# Patient Record
Sex: MALE | Race: WHITE | NOT HISPANIC OR LATINO | Employment: OTHER | URBAN - METROPOLITAN AREA
[De-identification: names, ages, dates, MRNs, and addresses within clinical notes are randomized per-mention and may not be internally consistent; named-entity substitution may affect disease eponyms.]

---

## 2017-11-02 ENCOUNTER — OFFICE VISIT (OUTPATIENT)
Dept: SURGERY | Facility: CLINIC | Age: 72
End: 2017-11-02
Payer: COMMERCIAL

## 2017-11-02 VITALS
HEART RATE: 71 BPM | SYSTOLIC BLOOD PRESSURE: 120 MMHG | WEIGHT: 159 LBS | HEIGHT: 68 IN | DIASTOLIC BLOOD PRESSURE: 72 MMHG | RESPIRATION RATE: 16 BRPM | BODY MASS INDEX: 24.1 KG/M2

## 2017-11-02 DIAGNOSIS — K40.90 LEFT INGUINAL HERNIA: Primary | ICD-10-CM

## 2017-11-02 PROCEDURE — 99204 OFFICE O/P NEW MOD 45 MIN: CPT | Performed by: SURGERY

## 2017-11-02 RX ORDER — IBUPROFEN 200 MG
400 TABLET ORAL PRN
Status: ON HOLD | COMMUNITY
End: 2019-08-28

## 2017-11-02 NOTE — MR AVS SNAPSHOT
"              After Visit Summary   2017    Jose Alfredo Caceres    MRN: [id number]           Patient Information     Date Of Birth          1945        Visit Information        Provider Department      2017 11:00 AM Brennan Boyd MD [facility]       Follow-ups after your visit        Who to contact     If you have questions or need follow up information about today's clinic visit or your schedule please contact [facility] directly at [phone].  Normal or non-critical lab and imaging results will be communicated to you by [employer], letter or phone within 4 business days after the clinic has received the results. If you do not hear from us within 7 days, please contact the clinic through [employer] or phone. If you have a critical or abnormal lab result, we will notify you by phone as soon as possible.  Submit refill requests through [employer] or call your pharmacy and they will forward the refill request to us. Please allow 3 business days for your refill to be completed.          Additional Information About Your Visit        MyChart Information     [employer] lets you send messages to your doctor, view your test results, renew your prescriptions, schedule appointments and more. To sign up, go to [email or web address] . Click on \"Log in\" on the left side of the screen, which will take you to the Welcome page. Then click on \"Sign up Now\" on the right side of the page.     You will be asked to enter the access code listed below, as well as some personal information. Please follow the directions to create your username and password.     Your access code is: [email or web address]  Expires: 2018 11:00 AM     Your access code will  in 90 days. If you need help or a new code, please call your [facility]3] or [phone].        Care EveryWhere ID     This is your Care EveryWhere ID. This could be used by other organizations " "to access your [place] medical records  [id number]        Your Vitals Were     Pulse Respirations Height BMI (Body Mass Index)          71 16 5' 8\" (1.727 m) 24.18 kg/m2         Blood Pressure from Last 3 Encounters:   11/02/17 120/72    Weight from Last 3 Encounters:   11/02/17 159 lb (72.1 kg)              Today, you had the following     No orders found for display       Primary Care Provider Office Phone # Fax #    Kendell Navarrete [phone]       [address] [address]        Equal Access to Services     [facility]: Hadii aad ku hadasho Soomaali, waaxda luqadaha, qaybta kaalmada adeegyada, river holt adesarath pisano . So [facility] [phone].    ATENCIÓN: Si habla español, tiene a britt disposición servicios gratuitos de asistencia lingüística. [facility] [phone].    We comply with applicable federal civil rights laws and [place] laws. We do not discriminate on the basis of race, color, national origin, age, disability, sex, sexual orientation, or gender identity.            Thank you!     Thank you for choosing [facility]  for your care. Our goal is always to provide you with excellent care. Hearing back from our patients is one way we can continue to improve our services. Please take a few minutes to complete the written survey that you may receive in the mail after your visit with us. Thank you!             Your Updated Medication List - Protect others around you: Learn how to safely use, store and throw away your medicines at www.disposemymeds.org.          This list is accurate as of: 11/2/17 11:00 AM.  Always use your most recent med list.                   Brand Name Dispense Instructions for use Diagnosis    ASPIRIN PO      Take by mouth as needed for moderate pain        IBUPROFEN PO      Take 400 mg by mouth as needed for moderate pain          "

## 2017-11-02 NOTE — LETTER
"2017    Re: Jose Alfredo Caceres : 1945    Patient is a 72-year-old gentleman referred by the above-mentioned primary care provider for consultation regarding left inguinal hernia.  Reports he noticed this approximately three months ago. He been lifting some heavy logs.  He noted a bulge proximally one week later.  This causes occasional mild discomfort.  No signs or symptoms to suggest incarceration or strangulation.  No bowel obstruction symptoms.     PMH:   has no past medical history on file.  PSH:    has a past surgical history that includes orthopedic surgery (Right, 2017).  Social History:   reports that he quit smoking about 49 years ago. He does not have any smokeless tobacco history on file. He reports that he drinks alcohol. He reports that he does not use illicit drugs.  Family History:  family history includes Other Cancer in his mother.  Medications/Allergies: Home medications and allergies reviewed.     ROS:  The 10 point Review of Systems is negative other than noted in the HPI.     Physical Exam:  /72  Pulse 71  Resp 16  Ht 5' 8\" (1.727 m)  Wt 159 lb (72.1 kg)  BMI 24.18 kg/m2  GENERAL: Generally appears well.  Psych: Alert and Oriented.  Normal affect  Eyes: Sclera clear  Respiratory:  Lungs clear to ausculation bilaterally with good air excursion  Cardiovascular:  Regular Rate and Rhythm with no murmurs gallops or rubs, normal peripheral pulses  GI: Abdomen Non Distended Non-Tender  No hernias palpated..  Groin- I examined the patient in both the standing and supine positions. Right Groin- No hernia Palpated. Left Groin- Moderate sized inguinal hernia.  Hernia was easily reduciable. No scrotal or testicle abnormalities.  Lymphatic/Hematologic/Immune:  No femoral or cervical lymphadenopathy.  Integumentary:  No rashes  Neurological: grossly intact      All new lab and imaging data was reviewed.      Impression and Plan:  Patient is a 72 year old male with left inguinal " hernia     PLAN: Plan outpatient laparoscopic repair possible bilateral and his convenience  I discussed the pathophysiology of hernias and options for repair including laparoscopic VS open.  The risks associated with the procedure including, but not limited to, recurrence, nerve entrapment or injury, persistence of pain, injury to the bowel/bladder, infertility, hematoma, mesh migration, mesh infection, MI, and PE were discussed with the patient. He indicated understanding of the discussion, asked appropriate questions, and provided consent. Signs and symptoms of incarceration were discussed. If these develop in the interim, he promises to call or go straight to the ER. I have provided the patient with an information pamphlet.  Thank you very much for this consult.     Brennan Boyd M.D.

## 2017-11-06 NOTE — PROGRESS NOTES
"[facility]  Surgery Consultation    CONSULTATION REQUESTED BY:  Kendell Navarrete [phone]    HPI: Patient is a 72-year-old gentleman referred by the above-mentioned primary care provider for consultation regarding left inguinal hernia.  Reports he noticed this approximately three months ago.  He been lifting some heavy logs.  He noted a bulge proximally one week later.  This causes occasional mild discomfort.  No signs or symptoms to suggest incarceration or strangulation.  No bowel obstruction symptoms.    PMH:   has no past medical history on file.  PSH:    has a past surgical history that includes orthopedic surgery (Right, 02/2017).  Social History:   reports that he quit smoking about 49 years ago. He does not have any smokeless tobacco history on file. He reports that he drinks alcohol. He reports that he does not use illicit drugs.  Family History:  family history includes Other Cancer in his mother.  Medications/Allergies: Home medications and allergies reviewed.    ROS:  The 10 point Review of Systems is negative other than noted in the HPI.    Physical Exam:  /72  Pulse 71  Resp 16  Ht 5' 8\" (1.727 m)  Wt 159 lb (72.1 kg)  BMI 24.18 kg/m2  GENERAL: Generally appears well.  Psych: Alert and Oriented.  Normal affect  Eyes: Sclera clear  Respiratory:  Lungs clear to ausculation bilaterally with good air excursion  Cardiovascular:  Regular Rate and Rhythm with no murmurs gallops or rubs, normal peripheral pulses  GI: Abdomen Non Distended Non-Tender  No hernias palpated..  Groin- I examined the patient in both the standing and supine positions. Right Groin- No hernia Palpated. Left Groin- Moderate sized inguinal hernia.  Hernia was easily reduciable. No scrotal or testicle abnormalities.  Lymphatic/Hematologic/Immune:  No femoral or cervical lymphadenopathy.  Integumentary:  No rashes  Neurological: grossly intact     All new lab and imaging data was reviewed.     Impression " and Plan:  Patient is a 72 year old male with left inguinal hernia    PLAN: Plan outpatient laparoscopic repair possible bilateral and his convenience  I discussed the pathophysiology of hernias and options for repair including laparoscopic VS open.  The risks associated with the procedure including, but not limited to, recurrence, nerve entrapment or injury, persistence of pain, injury to the bowel/bladder, infertility, hematoma, mesh migration, mesh infection, MI, and PE were discussed with the patient. He indicated understanding of the discussion, asked appropriate questions, and provided consent. Signs and symptoms of incarceration were discussed. If these develop in the interim, he promises to call or go straight to the ER. I have provided the patient with an information pamphlet.    Thank you very much for this consult.    Brennan Boyd M.D.  [facility]  [phone]    Please route or send letter to:  Primary Care Provider (PCP) and Referring Provider

## 2017-11-10 ENCOUNTER — APPOINTMENT (OUTPATIENT)
Dept: SURGERY | Facility: PHYSICIAN GROUP | Age: 72
End: 2017-11-10
Payer: COMMERCIAL

## 2017-11-10 ENCOUNTER — ANESTHESIA (OUTPATIENT)
Dept: SURGERY | Facility: CLINIC | Age: 72
End: 2017-11-10
Payer: MEDICARE

## 2017-11-10 ENCOUNTER — HOSPITAL ENCOUNTER (OUTPATIENT)
Facility: CLINIC | Age: 72
Discharge: HOME | End: 2017-11-10
Attending: SURGERY | Admitting: SURGERY
Payer: MEDICARE

## 2017-11-10 ENCOUNTER — ANESTHESIA EVENT (OUTPATIENT)
Dept: SURGERY | Facility: CLINIC | Age: 72
End: 2017-11-10
Payer: MEDICARE

## 2017-11-10 ENCOUNTER — SURGERY (OUTPATIENT)
Age: 72
End: 2017-11-10

## 2017-11-10 VITALS
RESPIRATION RATE: 18 BRPM | BODY MASS INDEX: 24.52 KG/M2 | OXYGEN SATURATION: 100 % | WEIGHT: 161.8 LBS | HEIGHT: 68 IN | SYSTOLIC BLOOD PRESSURE: 122 MMHG | DIASTOLIC BLOOD PRESSURE: 68 MMHG | TEMPERATURE: 97.4 F

## 2017-11-10 DIAGNOSIS — K40.20 NON-RECURRENT BILATERAL INGUINAL HERNIA WITHOUT OBSTRUCTION OR GANGRENE: Primary | ICD-10-CM

## 2017-11-10 PROCEDURE — 25000566 ZZH SEVOFLURANE, EA 15 MIN: Performed by: SURGERY

## 2017-11-10 PROCEDURE — 25000128 H RX IP 250 OP 636: Performed by: NURSE ANESTHETIST, CERTIFIED REGISTERED

## 2017-11-10 PROCEDURE — 25000125 ZZHC RX 250: Performed by: NURSE ANESTHETIST, CERTIFIED REGISTERED

## 2017-11-10 PROCEDURE — 37000008 ZZH ANESTHESIA TECHNICAL FEE, 1ST 30 MIN: Performed by: SURGERY

## 2017-11-10 PROCEDURE — 49650 LAP ING HERNIA REPAIR INIT: CPT | Mod: AS | Performed by: PHYSICIAN ASSISTANT

## 2017-11-10 PROCEDURE — A9270 NON-COVERED ITEM OR SERVICE: HCPCS | Mod: GY | Performed by: PHYSICIAN ASSISTANT

## 2017-11-10 PROCEDURE — 71000013 ZZH RECOVERY PHASE 1 LEVEL 1 EA ADDTL HR: Performed by: SURGERY

## 2017-11-10 PROCEDURE — 25000132 ZZH RX MED GY IP 250 OP 250 PS 637: Mod: GY | Performed by: PHYSICIAN ASSISTANT

## 2017-11-10 PROCEDURE — 25000125 ZZHC RX 250: Performed by: SURGERY

## 2017-11-10 PROCEDURE — 25000128 H RX IP 250 OP 636: Performed by: SURGERY

## 2017-11-10 PROCEDURE — C1781 MESH (IMPLANTABLE): HCPCS | Performed by: SURGERY

## 2017-11-10 PROCEDURE — 49650 LAP ING HERNIA REPAIR INIT: CPT | Mod: 50 | Performed by: SURGERY

## 2017-11-10 PROCEDURE — 27210794 ZZH OR GENERAL SUPPLY STERILE: Performed by: SURGERY

## 2017-11-10 PROCEDURE — 71000012 ZZH RECOVERY PHASE 1 LEVEL 1 FIRST HR: Performed by: SURGERY

## 2017-11-10 PROCEDURE — 36000056 ZZH SURGERY LEVEL 3 1ST 30 MIN: Performed by: SURGERY

## 2017-11-10 PROCEDURE — 37000009 ZZH ANESTHESIA TECHNICAL FEE, EACH ADDTL 15 MIN: Performed by: SURGERY

## 2017-11-10 PROCEDURE — 71000027 ZZH RECOVERY PHASE 2 EACH 15 MINS: Performed by: SURGERY

## 2017-11-10 PROCEDURE — 40000170 ZZH STATISTIC PRE-PROCEDURE ASSESSMENT II: Performed by: SURGERY

## 2017-11-10 PROCEDURE — 36000058 ZZH SURGERY LEVEL 3 EA 15 ADDTL MIN: Performed by: SURGERY

## 2017-11-10 DEVICE — MESH PROGRIP LAPAROSCOPIC 5.9X3.9" PARIETEX SELF-FIX LPG1510: Type: IMPLANTABLE DEVICE | Site: INGUINAL | Status: FUNCTIONAL

## 2017-11-10 RX ORDER — FENTANYL CITRATE 50 UG/ML
INJECTION, SOLUTION INTRAMUSCULAR; INTRAVENOUS PRN
Status: DISCONTINUED | OUTPATIENT
Start: 2017-11-10 | End: 2017-11-10

## 2017-11-10 RX ORDER — PROPOFOL 10 MG/ML
INJECTION, EMULSION INTRAVENOUS PRN
Status: DISCONTINUED | OUTPATIENT
Start: 2017-11-10 | End: 2017-11-10

## 2017-11-10 RX ORDER — SODIUM CHLORIDE, SODIUM LACTATE, POTASSIUM CHLORIDE, CALCIUM CHLORIDE 600; 310; 30; 20 MG/100ML; MG/100ML; MG/100ML; MG/100ML
INJECTION, SOLUTION INTRAVENOUS CONTINUOUS
Status: DISCONTINUED | OUTPATIENT
Start: 2017-11-10 | End: 2017-11-10 | Stop reason: HOSPADM

## 2017-11-10 RX ORDER — HYDROMORPHONE HYDROCHLORIDE 1 MG/ML
.3-.5 INJECTION, SOLUTION INTRAMUSCULAR; INTRAVENOUS; SUBCUTANEOUS EVERY 10 MIN PRN
Status: DISCONTINUED | OUTPATIENT
Start: 2017-11-10 | End: 2017-11-10 | Stop reason: HOSPADM

## 2017-11-10 RX ORDER — SODIUM CHLORIDE, SODIUM LACTATE, POTASSIUM CHLORIDE, CALCIUM CHLORIDE 600; 310; 30; 20 MG/100ML; MG/100ML; MG/100ML; MG/100ML
INJECTION, SOLUTION INTRAVENOUS CONTINUOUS PRN
Status: DISCONTINUED | OUTPATIENT
Start: 2017-11-10 | End: 2017-11-10

## 2017-11-10 RX ORDER — NALOXONE HYDROCHLORIDE 0.4 MG/ML
.1-.4 INJECTION, SOLUTION INTRAMUSCULAR; INTRAVENOUS; SUBCUTANEOUS
Status: DISCONTINUED | OUTPATIENT
Start: 2017-11-10 | End: 2017-11-10 | Stop reason: HOSPADM

## 2017-11-10 RX ORDER — FENTANYL CITRATE 0.05 MG/ML
25-50 INJECTION, SOLUTION INTRAMUSCULAR; INTRAVENOUS
Status: DISCONTINUED | OUTPATIENT
Start: 2017-11-10 | End: 2017-11-10 | Stop reason: HOSPADM

## 2017-11-10 RX ORDER — PROPOFOL 10 MG/ML
INJECTION, EMULSION INTRAVENOUS CONTINUOUS PRN
Status: DISCONTINUED | OUTPATIENT
Start: 2017-11-10 | End: 2017-11-10

## 2017-11-10 RX ORDER — ONDANSETRON 2 MG/ML
4 INJECTION INTRAMUSCULAR; INTRAVENOUS EVERY 30 MIN PRN
Status: DISCONTINUED | OUTPATIENT
Start: 2017-11-10 | End: 2017-11-10 | Stop reason: HOSPADM

## 2017-11-10 RX ORDER — GLYCOPYRROLATE 0.2 MG/ML
INJECTION, SOLUTION INTRAMUSCULAR; INTRAVENOUS PRN
Status: DISCONTINUED | OUTPATIENT
Start: 2017-11-10 | End: 2017-11-10

## 2017-11-10 RX ORDER — ONDANSETRON 4 MG/1
4 TABLET, ORALLY DISINTEGRATING ORAL EVERY 30 MIN PRN
Status: DISCONTINUED | OUTPATIENT
Start: 2017-11-10 | End: 2017-11-10 | Stop reason: HOSPADM

## 2017-11-10 RX ORDER — OXYCODONE AND ACETAMINOPHEN 5; 325 MG/1; MG/1
1-2 TABLET ORAL
Status: COMPLETED | OUTPATIENT
Start: 2017-11-10 | End: 2017-11-10

## 2017-11-10 RX ORDER — ONDANSETRON 2 MG/ML
INJECTION INTRAMUSCULAR; INTRAVENOUS PRN
Status: DISCONTINUED | OUTPATIENT
Start: 2017-11-10 | End: 2017-11-10

## 2017-11-10 RX ORDER — BUPIVACAINE HYDROCHLORIDE AND EPINEPHRINE 2.5; 5 MG/ML; UG/ML
30 INJECTION, SOLUTION INFILTRATION; PERINEURAL ONCE
Status: DISCONTINUED | OUTPATIENT
Start: 2017-11-10 | End: 2017-11-10 | Stop reason: HOSPADM

## 2017-11-10 RX ORDER — LIDOCAINE HYDROCHLORIDE 20 MG/ML
INJECTION, SOLUTION INFILTRATION; PERINEURAL PRN
Status: DISCONTINUED | OUTPATIENT
Start: 2017-11-10 | End: 2017-11-10

## 2017-11-10 RX ORDER — OXYCODONE AND ACETAMINOPHEN 5; 325 MG/1; MG/1
1-2 TABLET ORAL EVERY 4 HOURS PRN
Qty: 30 TABLET | Refills: 0 | Status: SHIPPED | OUTPATIENT
Start: 2017-11-10 | End: 2017-11-29

## 2017-11-10 RX ORDER — CEFAZOLIN SODIUM 2 G/100ML
2 INJECTION, SOLUTION INTRAVENOUS
Status: COMPLETED | OUTPATIENT
Start: 2017-11-10 | End: 2017-11-10

## 2017-11-10 RX ORDER — MEPERIDINE HYDROCHLORIDE 25 MG/ML
12.5 INJECTION INTRAMUSCULAR; INTRAVENOUS; SUBCUTANEOUS
Status: DISCONTINUED | OUTPATIENT
Start: 2017-11-10 | End: 2017-11-10 | Stop reason: HOSPADM

## 2017-11-10 RX ORDER — DEXAMETHASONE SODIUM PHOSPHATE 4 MG/ML
INJECTION, SOLUTION INTRA-ARTICULAR; INTRALESIONAL; INTRAMUSCULAR; INTRAVENOUS; SOFT TISSUE PRN
Status: DISCONTINUED | OUTPATIENT
Start: 2017-11-10 | End: 2017-11-10

## 2017-11-10 RX ORDER — CEFAZOLIN SODIUM 1 G/3ML
1 INJECTION, POWDER, FOR SOLUTION INTRAMUSCULAR; INTRAVENOUS SEE ADMIN INSTRUCTIONS
Status: DISCONTINUED | OUTPATIENT
Start: 2017-11-10 | End: 2017-11-10 | Stop reason: HOSPADM

## 2017-11-10 RX ORDER — BUPIVACAINE HYDROCHLORIDE AND EPINEPHRINE 5; 5 MG/ML; UG/ML
INJECTION, SOLUTION PERINEURAL PRN
Status: DISCONTINUED | OUTPATIENT
Start: 2017-11-10 | End: 2017-11-10 | Stop reason: HOSPADM

## 2017-11-10 RX ORDER — EPHEDRINE SULFATE 50 MG/ML
INJECTION, SOLUTION INTRAMUSCULAR; INTRAVENOUS; SUBCUTANEOUS PRN
Status: DISCONTINUED | OUTPATIENT
Start: 2017-11-10 | End: 2017-11-10

## 2017-11-10 RX ORDER — NEOSTIGMINE METHYLSULFATE 1 MG/ML
VIAL (ML) INJECTION PRN
Status: DISCONTINUED | OUTPATIENT
Start: 2017-11-10 | End: 2017-11-10

## 2017-11-10 RX ADMIN — NEOSTIGMINE METHYLSULFATE 3.5 MG: 1 INJECTION INTRAMUSCULAR; INTRAVENOUS; SUBCUTANEOUS at 08:10

## 2017-11-10 RX ADMIN — PROPOFOL 30 MCG/KG/MIN: 10 INJECTION, EMULSION INTRAVENOUS at 07:56

## 2017-11-10 RX ADMIN — SODIUM CHLORIDE, POTASSIUM CHLORIDE, SODIUM LACTATE AND CALCIUM CHLORIDE: 600; 310; 30; 20 INJECTION, SOLUTION INTRAVENOUS at 08:55

## 2017-11-10 RX ADMIN — HYDROMORPHONE HYDROCHLORIDE 0.5 MG: 1 INJECTION, SOLUTION INTRAMUSCULAR; INTRAVENOUS; SUBCUTANEOUS at 09:40

## 2017-11-10 RX ADMIN — HYDROMORPHONE HYDROCHLORIDE 0.5 MG: 1 INJECTION, SOLUTION INTRAMUSCULAR; INTRAVENOUS; SUBCUTANEOUS at 10:00

## 2017-11-10 RX ADMIN — Medication 5 MG: at 08:00

## 2017-11-10 RX ADMIN — FENTANYL CITRATE 50 MCG: 50 INJECTION INTRAMUSCULAR; INTRAVENOUS at 08:50

## 2017-11-10 RX ADMIN — CEFAZOLIN SODIUM 2 G: 2 INJECTION, SOLUTION INTRAVENOUS at 07:33

## 2017-11-10 RX ADMIN — LIDOCAINE HYDROCHLORIDE 80 MG: 20 INJECTION, SOLUTION INFILTRATION; PERINEURAL at 07:23

## 2017-11-10 RX ADMIN — FENTANYL CITRATE 100 MCG: 50 INJECTION, SOLUTION INTRAMUSCULAR; INTRAVENOUS at 07:23

## 2017-11-10 RX ADMIN — DEXAMETHASONE SODIUM PHOSPHATE 4 MG: 4 INJECTION, SOLUTION INTRA-ARTICULAR; INTRALESIONAL; INTRAMUSCULAR; INTRAVENOUS; SOFT TISSUE at 07:52

## 2017-11-10 RX ADMIN — ONDANSETRON 4 MG: 2 INJECTION INTRAMUSCULAR; INTRAVENOUS at 08:10

## 2017-11-10 RX ADMIN — FENTANYL CITRATE 50 MCG: 50 INJECTION INTRAMUSCULAR; INTRAVENOUS at 09:00

## 2017-11-10 RX ADMIN — BUPIVACAINE HYDROCHLORIDE AND EPINEPHRINE BITARTRATE 30 ML: 5; .005 INJECTION, SOLUTION PERINEURAL at 08:00

## 2017-11-10 RX ADMIN — SODIUM CHLORIDE, POTASSIUM CHLORIDE, SODIUM LACTATE AND CALCIUM CHLORIDE: 600; 310; 30; 20 INJECTION, SOLUTION INTRAVENOUS at 07:21

## 2017-11-10 RX ADMIN — PROPOFOL 200 MG: 10 INJECTION, EMULSION INTRAVENOUS at 07:23

## 2017-11-10 RX ADMIN — OXYCODONE HYDROCHLORIDE AND ACETAMINOPHEN 1 TABLET: 5; 325 TABLET ORAL at 09:55

## 2017-11-10 RX ADMIN — HYDROMORPHONE HYDROCHLORIDE 0.5 MG: 1 INJECTION, SOLUTION INTRAMUSCULAR; INTRAVENOUS; SUBCUTANEOUS at 09:17

## 2017-11-10 RX ADMIN — GLYCOPYRROLATE 0.5 MG: 0.2 INJECTION, SOLUTION INTRAMUSCULAR; INTRAVENOUS at 08:10

## 2017-11-10 NOTE — DISCHARGE INSTRUCTIONS
Same Day Surgery Discharge Instructions for  Sedation and General Anesthesia       It's not unusual to feel dizzy, light-headed or faint for up to 24 hours after surgery or while taking pain medication.  If you have these symptoms: sit for a few minutes before standing and have someone assist you when you get up to walk or use the bathroom.      You should rest and relax for the next 24 hours. We recommend you make arrangements to have an adult stay with you for at least 24 hours after your discharge.  Avoid hazardous and strenuous activity.      DO NOT DRIVE any vehicle or operate mechanical equipment for 24 hours following the end of your surgery.  Even though you may feel normal, your reactions may be affected by the medication you have received.      Do not drink alcoholic beverages for 24 hours following surgery.       Slowly progress to your regular diet as you feel able. It's not unusual to feel nauseated and/or vomit after receiving anesthesia.  If you develop these symptoms, drink clear liquids (apple juice, ginger ale, broth, 7-up, etc. ) until you feel better.  If your nausea and vomiting persists for 24 hours, please notify your surgeon.        All narcotic pain medications, along with inactivity and anesthesia, can cause constipation. Drinking plenty of liquids and increasing fiber intake will help.      For any questions of a medical nature, call your surgeon.      Do not make important decisions for 24 hours.      If you had general anesthesia, you may have a sore throat for a couple of days related to the breathing tube used during surgery.  You may use Cepacol lozenges to help with this discomfort.  If it worsens or if you develop a fever, contact your surgeon.       If you feel your pain is not well managed with the pain medications prescribed by your surgeon, please contact your surgeon's office to let them know so they can address your concerns.       [facility]  Discharge Instructions: Post-Operative Laparoscopic Inguinal Hernia    ACTIVITY    Increase your activity gradually.  Avoid strenuous physical activity or heavy lifting greater than 15 lbs. for 3-4 weeks.  You may climb stairs.     You may drive without restrictions when you are not using any prescription pain medication and comfortable in a car.    You may return to work/school when you are comfortable without any prescription pain medication.    WOUND CARE    You may remove your bandage and shower 48 hours after the surgery.  Pat your incisions dry and leave open to air.  Re-apply dressing (Band-aid or gauze/tape) as needed for drainage.    You may have steri-strips (looks like tape) or Dermabond (looks like glue) on your incision.  Leave it alone, it will peel up and fall off on its own.     Do not soak your incisions in a tub or pool for 2 weeks.     DIET    Return to the diet you were on before surgery.    Pain medications can cause constipation.  Limit use when possible.  Take over the counter stool softener/stimulant, such as Colace or Senna, with plenty of water.      PAIN    Expect some tenderness and discomfort at the incision site(s).  Use the prescribed pain medication at your discretion.  Expect gradual resolution of your pain over several days.    You may take ibuprofen with food (unless you have been told not to) instead of or in addition to your prescribed pain medication.  If you are taking Norco or Percocet, do not take any additional acetaminophen/APAP/Tylenol.    Do not drink alcohol or drive while you are taking pain medications.    You may apply ice to your incisions in 20 minute intervals as needed for the next 48 hours.  After that time, consider switching to heat if you prefer.    EXPECTATIONS    Male patients can expect some swelling, bruising possibly involving the testicles and penis, and/or some numbness.  These symptoms are expected.  Use ice to help with the swelling.  Try  placing a rolled hand towel below your scrotum to help alleviate your scrotal discomfort.     You may have shoulder or upper back discomfort due to the gas used in surgery.  This is temporary and should resolve in 48-72 hours.  Short, frequent walks may help with this.    RETURN APPOINTMENT    Follow up with your surgeon or a PA in 2 weeks.  Please call the office at [phone] to schedule your appointment.    CALL OUR OFFICE IF YOU HAVE:     Chills or fever above 101.5 F.    Increased redness or drainage at your incisions.    Significant bleeding.    Pain not relieved by your pain medication or rest.    Increasing pain after the first 48 hours.    Any other concerns or questions.    Revised August 2017                            **If you have questions or concerns about your procedure,  call Dr. Boyd at [phone]**

## 2017-11-10 NOTE — ANESTHESIA PREPROCEDURE EVALUATION
Procedure: Procedure(s):  LAPAROSCOPIC HERNIORRHAPHY INGUINAL  LAPAROSCOPIC HERNIORRHAPHY INGUINAL BILATERAL  Preop diagnosis: left inguinal hernia    No Known Allergies  Past Medical History:   Diagnosis Date     Back pain      Elevated C-reactive protein (CRP)      Fracture of lower leg     ski accident     Homocysteinemia (H)      Hyperlipidemia      Inguinal hernia, left      Past Surgical History:   Procedure Laterality Date     GENITOURINARY SURGERY      vasectomy     HEAD & NECK SURGERY      wisdom teeth extraction     ORTHOPEDIC SURGERY Right 02/2017    Foot      ORTHOPEDIC SURGERY      tibia fracture surgery     Social History   Substance Use Topics     Smoking status: Former Smoker     Quit date: 1/1/1968     Smokeless tobacco: Never Used     Alcohol use Yes      Comment: 1 glass wine per day     Prior to Admission medications    Medication Sig Start Date End Date Taking? Authorizing Provider   IBUPROFEN PO Take 400 mg by mouth as needed for moderate pain   Yes Reported, Patient   ASPIRIN PO Take by mouth as needed for moderate pain   Yes Reported, Patient     Current Facility-Administered Medications Ordered in Epic   Medication Dose Route Frequency Last Rate Last Dose     ceFAZolin (ANCEF) intermittent infusion 2 g in 100 mL dextrose PRE-MIX  2 g Intravenous Pre-Op/Pre-procedure x 1 dose         ceFAZolin (ANCEF) 1 g vial to attach to  ml bag for ADULT or 50 ml bag for PEDS  1 g Intravenous See Admin Instructions         No current [facility]-ordered outpatient prescriptions on file.        Wt Readings from Last 1 Encounters:   11/10/17 73.4 kg (161 lb 12.8 oz)     Temp Readings from Last 1 Encounters:   11/10/17 36.1  C (97  F) (Temporal)     BP Readings from Last 6 Encounters:   11/10/17 137/87   11/02/17 120/72     Pulse Readings from Last 4 Encounters:   11/02/17 71     Resp Readings from Last 1 Encounters:   11/10/17 16     SpO2 Readings from Last 1 Encounters:   11/10/17 95%     No results for  input(s): NA, POTASSIUM, CHLORIDE, CO2, ANIONGAP, GLC, BUN, CR, KIANNA in the last 08432 hours.  No results for input(s): AST, ALT, ALKPHOS, BILITOTAL, LIPASE in the last 11641 hours.  No results for input(s): WBC, HGB, PLT in the last 37879 hours.  No results for input(s): ABO, RH in the last 68814 hours.  No results for input(s): INR, PTT in the last 64104 hours.   No results for input(s): TROPI in the last 78265 hours.  No results for input(s): PH, PCO2, PO2, HCO3 in the last 57852 hours.  No results for input(s): HCG in the last 06992 hours.  No results found for this or any previous visit (from the past 744 hour(s)).    RECENT LABS:   ECG:   ECHO:       Anesthesia Evaluation     .             ROS/MED HX    ENT/Pulmonary:  - neg pulmonary ROS     Neurologic:  - neg neurologic ROS     Cardiovascular:     (+) Dyslipidemia, ----. : . . . :. .       METS/Exercise Tolerance:  >4 METS   Hematologic:         Musculoskeletal:         GI/Hepatic:         Renal/Genitourinary:         Endo:         Psychiatric:  - neg psychiatric ROS       Infectious Disease:         Malignancy:         Other:                     Physical Exam  Normal systems: dental    Airway   Mallampati: I  TM distance: >3 FB  Neck ROM: full    Dental     Cardiovascular   Rhythm and rate: regular and normal      Pulmonary    breath sounds clear to auscultation                    Anesthesia Plan      History & Physical Review  History and physical reviewed and following examination; no interval change.    ASA Status:  2 .    NPO Status:  > 8 hours    Plan for General and ETT with Intravenous and Propofol induction. Maintenance will be Balanced.    PONV prophylaxis:  Ondansetron (or other 5HT-3) and Dexamethasone or Solumedrol       Postoperative Care  Postoperative pain management:  IV analgesics.      Consents  Anesthetic plan, risks, benefits and alternatives discussed with:  Patient..                          .

## 2017-11-10 NOTE — BRIEF OP NOTE
[facility] Brief Operative Note    Pre-operative diagnosis: Left inguinal hernia   Post-operative diagnosis Bilateral inguinal hernias     Procedure: Procedure(s):  Laparoscopic bilateral inguinal hernia repair with mesh   Wound Class: I-Clean     Surgeon(s): Surgeon(s) and Role:     * Brennan Boyd MD - Primary     * Zarina Claudio PA-C - Assisting   Estimated blood loss: 2ml    Specimens: * No specimens in log *   Findings: 27l68hc progrip mesh placed x 2. No immediate complications. See operative report for full details.           Zarina Claudio PA-C  [facility]  [phone]

## 2017-11-10 NOTE — IP AVS SNAPSHOT
MRN:[id number]                      After Visit Summary   11/10/2017    Jose Alfredo Caceres    MRN: [id number]           Thank you!     Thank you for choosing [facility] for your care. Our goal is always to provide you with excellent care. Hearing back from our patients is one way we can continue to improve our services. Please take a few minutes to complete the written survey that you may receive in the mail after you visit with us. Thank you!        Patient Information     Date Of Birth          1945        About your hospital stay     You were admitted on:  November 10, 2017 You last received care in the:  [facility] PACU    You were discharged on:  November 10, 2017       Who to Call     For medical emergencies, please call 911.  For non-urgent questions about your medical care, please call your primary care provider or clinic, [phone]  For questions related to your surgery, please call your surgery clinic        Attending Provider     Provider Specialty    Brennan Boyd MD General Surgery       Primary Care Provider Office Phone # Fax #    Kendell Navarrete [phone]      After Care Instructions     Diet Instructions       Resume pre-procedure diet            Discharge Instructions       Please follow-up in 2 weeks in Dr. Boyd's office for your first postoperative appointment. Call [phone] to schedule.  Our [facility] name [facility]. The address is [address] [place] [address] [address]\[facility] [place] [facility]\"", [address], [place], [address]            Discharge Instructions       CONSTIPATION PREVENTION  We recommend that you go to a pharmacy and purchase ONE of the following stool softeners/laxatives and start taking today to prevent constipation. You can stop this bowel regimen once you are no longer taking your narcotic pain medication or are having regular bowel movements:    - Senokot oral once daily  - Milk of magnesium once daily  - Docusate Sodium 1 pill twice daily (stool  softener)  - Miralax 1 scoop/packet 1-2 times daily (laxative)    In addition to the above options, if constipation continues, you can add:   - 4 oz Prune juice or Plum juice daily for constipation t            Discharge Instructions       GROIN SWELLING/DISCOLORATION AFTER HERNIA REPAIR  You may notice air in your scrotum and/or penis initially after the surgery. This is due to the insufflation we use during the laparoscopic operation. This will go away over the next few days. You may also notice bruising or a purple discoloration to your scrotum and/or penis - this will also resolve over the next week or two. You can apply ice as needed to your incisions and groin as needed for pain and swelling. If you develop significant scrotal or penile pain please call our office at [phone].            Dressing       Keep dressings clean and dry. Remove the bandaids on post-op day #2 (Sunday). Do not remove the small white steri strips over your incision. These will typically fall off on their own in 7-10 days. Do not attempt to replace these if they should fall off sooner. On post-op day #2, after you've removed the bandaids, you can shower normally. You can allow warm water and soap to run over the incision. Do not scrub the incision. After showering pat your skin and steri strips dry. Do not submerge or soak your incisions under water for 2 weeks.            No Alcohol       For 24 hours post procedure            No driving or operating machinery        until the day after procedure            No lifting        No lifting over 20 lbs and no strenuous physical activity for 2-3 weeks                  Further instructions from your care team       Same Day Surgery Discharge Instructions for  Sedation and General Anesthesia       It's not unusual to feel dizzy, light-headed or faint for up to 24 hours after surgery or while taking pain medication.  If you have these symptoms: sit for a few minutes before standing and have  someone assist you when you get up to walk or use the bathroom.      You should rest and relax for the next 24 hours. We recommend you make arrangements to have an adult stay with you for at least 24 hours after your discharge.  Avoid hazardous and strenuous activity.      DO NOT DRIVE any vehicle or operate mechanical equipment for 24 hours following the end of your surgery.  Even though you may feel normal, your reactions may be affected by the medication you have received.      Do not drink alcoholic beverages for 24 hours following surgery.       Slowly progress to your regular diet as you feel able. It's not unusual to feel nauseated and/or vomit after receiving anesthesia.  If you develop these symptoms, drink clear liquids (apple juice, ginger ale, broth, 7-up, etc. ) until you feel better.  If your nausea and vomiting persists for 24 hours, please notify your surgeon.        All narcotic pain medications, along with inactivity and anesthesia, can cause constipation. Drinking plenty of liquids and increasing fiber intake will help.      For any questions of a medical nature, call your surgeon.      Do not make important decisions for 24 hours.      If you had general anesthesia, you may have a sore throat for a couple of days related to the breathing tube used during surgery.  You may use Cepacol lozenges to help with this discomfort.  If it worsens or if you develop a fever, contact your surgeon.       If you feel your pain is not well managed with the pain medications prescribed by your surgeon, please contact your surgeon's office to let them know so they can address your concerns.       [facility]  Discharge Instructions: Post-Operative Laparoscopic Inguinal Hernia    ACTIVITY    Increase your activity gradually.  Avoid strenuous physical activity or heavy lifting greater than 15 lbs. for 3-4 weeks.  You may climb stairs.     You may drive without restrictions when  you are not using any prescription pain medication and comfortable in a car.    You may return to work/school when you are comfortable without any prescription pain medication.    WOUND CARE    You may remove your bandage and shower 48 hours after the surgery.  Pat your incisions dry and leave open to air.  Re-apply dressing (Band-aid or gauze/tape) as needed for drainage.    You may have steri-strips (looks like tape) or Dermabond (looks like glue) on your incision.  Leave it alone, it will peel up and fall off on its own.     Do not soak your incisions in a tub or pool for 2 weeks.     DIET    Return to the diet you were on before surgery.    Pain medications can cause constipation.  Limit use when possible.  Take over the counter stool softener/stimulant, such as Colace or Senna, with plenty of water.      PAIN    Expect some tenderness and discomfort at the incision site(s).  Use the prescribed pain medication at your discretion.  Expect gradual resolution of your pain over several days.    You may take ibuprofen with food (unless you have been told not to) instead of or in addition to your prescribed pain medication.  If you are taking Norco or Percocet, do not take any additional acetaminophen/APAP/Tylenol.    Do not drink alcohol or drive while you are taking pain medications.    You may apply ice to your incisions in 20 minute intervals as needed for the next 48 hours.  After that time, consider switching to heat if you prefer.    EXPECTATIONS    Male patients can expect some swelling, bruising possibly involving the testicles and penis, and/or some numbness.  These symptoms are expected.  Use ice to help with the swelling.  Try placing a rolled hand towel below your scrotum to help alleviate your scrotal discomfort.     You may have shoulder or upper back discomfort due to the gas used in surgery.  This is temporary and should resolve in 48-72 hours.  Short, frequent walks may help with this.    RETURN  "APPOINTMENT    Follow up with your surgeon or a PA in 2 weeks.  Please call the office at [phone] to schedule your appointment.    CALL OUR OFFICE IF YOU HAVE:     Chills or fever above 101.5 F.    Increased redness or drainage at your incisions.    Significant bleeding.    Pain not relieved by your pain medication or rest.    Increasing pain after the first 48 hours.    Any other concerns or questions.    Revised 2017                            **If you have questions or concerns about your procedure,  call Dr. Boyd at [phone]**      Pending Results     No orders found from 2017 to 2017.            Admission Information     Date & Time Provider Department Dept. Phone    11/10/2017 Brennan Boyd MD [facility] PACU [phone]      Your Vitals Were     Blood Pressure Temperature Respirations Height Weight Pulse Oximetry    134/81 96.5  F (35.8  C) (Temporal) 16 1.727 m (5' 8\") 73.4 kg (161 lb 12.8 oz) 97%    BMI (Body Mass Index)                   24.6 kg/m2           [employer] Information     [employer] lets you send messages to your doctor, view your test results, renew your prescriptions, schedule appointments and more. To sign up, go to [email or web address] . Click on \"Log in\" on the left side of the screen, which will take you to the Welcome page. Then click on \"Sign up Now\" on the right side of the page.     You will be asked to enter the access code listed below, as well as some personal information. Please follow the directions to create your username and password.     Your access code is: [email or web address]  Expires: 2018 10:00 AM     Your access code will  in 90 days. If you need help or a new code, please call your [place] clinic or [phone].        Care EveryWhere ID     This is your Care EveryWhere ID. This could be used by other organizations to access your [place] medical records  [id number]        Equal Access to Services     AMARA MEYER AH: " Hadii aad ku hadsuzyo Sograceali, waaxda luqadaha, qaybta kaalmada ji, river elacarley cortésnicole james. So [facility] [phone].    ATENCIÓN: Si stephanela sherry, tiene a britt disposición servicios gratuitos de asistencia lingüística. Llame al [phone].    We comply with applicable federal civil rights laws and [place] laws. We do not discriminate on the basis of race, color, national origin, age, disability, sex, sexual orientation, or gender identity.               Review of your medicines      START taking        Dose / Directions    oxyCODONE-acetaminophen 5-325 MG per tablet   Commonly known as:  PERCOCET   Used for:  Non-recurrent bilateral inguinal hernia without obstruction or gangrene   Notes to Patient:  One tab at 9:55am        Dose:  1-2 tablet   Take 1-2 tablets by mouth every 4 hours as needed for pain (moderate to severe)   Quantity:  30 tablet   Refills:  0         CONTINUE these medicines which have NOT CHANGED        Dose / Directions    ASPIRIN PO        Take by mouth as needed for moderate pain   Refills:  0       IBUPROFEN PO        Dose:  400 mg   Take 400 mg by mouth as needed for moderate pain   Refills:  0            Where to get your medicines      Some of these will need a paper prescription and others can be bought over the counter. Ask your nurse if you have questions.     Bring a paper prescription for each of these medications     oxyCODONE-acetaminophen 5-325 MG per tablet                Protect others around you: Learn how to safely use, store and throw away your medicines at www.disposemymeds.org.             Medication List: This is a list of all your medications and when to take them. Check marks below indicate your daily home schedule. Keep this list as a reference.      Medications           Morning Afternoon Evening Bedtime As Needed    ASPIRIN PO   Take by mouth as needed for moderate pain                                IBUPROFEN PO   Take 400 mg by mouth as needed  for moderate pain                                oxyCODONE-acetaminophen 5-325 MG per tablet   Commonly known as:  PERCOCET   Take 1-2 tablets by mouth every 4 hours as needed for pain (moderate to severe)   Last time this was given:  1 tablet on 11/10/2017  9:55 AM   Notes to Patient:  One tab at 9:55am

## 2017-11-10 NOTE — ANESTHESIA POSTPROCEDURE EVALUATION
Patient: Jose Alfredo Caceres    Procedure(s):  labilateral inguinal hernia with mesh - Wound Class: I-Clean  WITH MESH - Wound Class: I-Clean    Diagnosis:left inguinal hernia  Diagnosis Additional Information: No value filed.    Anesthesia Type:  General, ETT    Note:  Anesthesia Post Evaluation    Patient location during evaluation: PACU  Patient participation: Able to fully participate in evaluation  Level of consciousness: sleepy but conscious and responsive to verbal stimuli  Pain management: adequate  Airway patency: patent  Cardiovascular status: acceptable and hemodynamically stable  Respiratory status: acceptable and unassisted  Hydration status: acceptable  PONV: none     Anesthetic complications: None          Last vitals:  Vitals:    11/10/17 1045 11/10/17 1100 11/10/17 1130   BP: 117/78 140/77 122/68   Resp: 13 14 18   Temp: 36.3  C (97.4  F)     SpO2: 100% 100% 100%         Electronically Signed By: Rich Klein MD  November 10, 2017  12:28 PM

## 2017-11-10 NOTE — IP AVS SNAPSHOT
[place] [address]    [place] [address]    Phone:  [phone]                                       After Visit Summary   11/10/2017    Jose Alfredo Caceres    MRN: [id number]           After Visit Summary Signature Page     I have received my discharge instructions, and my questions have been answered. I have discussed any challenges I see with this plan with the nurse or doctor.    ..........................................................................................................................................  Patient/Patient Representative Signature      ..........................................................................................................................................  Patient Representative Print Name and Relationship to Patient    ..................................................               ................................................  Date                                            Time    ..........................................................................................................................................  Reviewed by Signature/Title    ...................................................              ..............................................  Date                                                            Time           home w/ assist/home w/ home PT

## 2017-11-10 NOTE — OP NOTE
General Surgery Operative Note    PREOPERATIVE DIAGNOSIS:  left inguinal hernia    POSTOPERATIVE DIAGNOSIS:  Bilateral inguinal hernias    PROCEDURE:  Laparoscopic bilateral inguinal hernia repair with mesh    ANESTHESIA:  General.    PREOPERATIVE MEDICATIONS:  Ancef iv    SURGEON:  Brennan Boyd M.D.    ASSISTANT:  Zarina Claudio PA-C, Physician's assistant first assistant was necessary during the performance of this procedure for expertise in patient positioning, prepping, draping, trocar placement, camera management, retraction and exposure, and suctioning.    INDICATIONS:  Patient presented to the office with symptomatic inguinal hernia.  Therapeutic options were thoroughly discussed.  It has been elected to proceed with a laparoscopic repair.  The potential risks of bleeding, infection, neurovascular injury to the vas deferens or testicle were all reviewed in detail.  Patient's questions were all answered.  He wishes to proceed.    DESCRIPTION OF PROCEDURE: The patient was taken to the operating suite and uneventfully endotracheally intubated. The abdomen and groin were prepped and draped in a sterile fashion. Mason catheter was placed using sterile technique for bladder decompression. Surgeon initiated timeout was acknowledged. We made a curvilinear incision at the underside of the umbilicus and took this down through skin and subcutaneous tissue. We dissected down until the anterior rectus sheath was encountered. We incised along the fascia such that we were looking at the rectus muscle directly. The rectus muscle was retracted laterally and we inserted our dissecting balloon along the posterior rectus sheath toward the pubic bone. Once this was in place, we removed the obturator and inserted our camera. We then instilled air into the dissecting balloon and under direct visualization created our preperitoneal space. Dissecting balloon was then removed and our working balloon was placed and positioned. Two 5  mm trocars were placed along the lower midline under direct laparoscopic visualization. We began our dissection on the right side. Using combination of sharp and blunt dissection, we created a plane behind the abdominal wall and the underlying peritoneum. This was done in a blunt and atraumatic fashion. The inferior epigastric vessels were visualized running along the underside of the abdominal wall.  We followed the epigastric vessels down until we were able to identify the internal ring. The spermatic cord was skeletonized.  Indirect hernia was present and reduced. We continued our dissection until we had an excellent space in the preperitoneal area. We then placed a Progrip mesh within this space.  Once we were satisfied with our position, we turned our attention toward the other side.   Using a combination of sharp and blunt dissection, we were able to dissect out the spermatic cord. We created a space between the peritoneum and the anterior abdominal wall. Once we had dissected out the spermatic cord, we were able to identify the vas and the spermatic vessels. We skeletonized these structures such that there was no intervening cord lipoma or hernia sac. Direct hernia was present and reduced. Once we were satisfied with the space that we had, we deployed another Progrip hernia mesh. Once we were satisfied with the position, the mesh was held in place and the insufflation was released. The mesh was held in excellent position under direct visualization until the insufflation was completely out of the preperitoneal space. We then removed the 5 mm working ports under direct visualization. We removed the working balloon. The fascia at the working port site was closed anteriorly using a 0 Vicryl stitch. Local anesthetic was injected at the incision sites.  Skin incisions were all closed with subcuticular 4-0 Vicryl stitch. Benzoin and Steri-Strips were applied. Needle and sponge counts were correct. The patient  tolerated this well and was taken from the operating room to the recovery room in stable condition.       ESTIMATED BLOOD LOSS:  10cc      Brennan Boyd MD

## 2017-11-10 NOTE — ANESTHESIA CARE TRANSFER NOTE
Patient: Jose Alfredo Caceres    Procedure(s):  labilateral inguinal hernia with mesh - Wound Class: I-Clean  WITH MESH - Wound Class: I-Clean    Diagnosis: left inguinal hernia  Diagnosis Additional Information: No value filed.    Anesthesia Type:   General, ETT     Note:  Airway :Face Mask  Patient transferred to:PACU  Handoff Report: Identifed the Patient, Identified the Reponsible Provider, Reviewed the pertinent medical history, Discussed the surgical course, Reviewed Intra-OP anesthesia mangement and issues during anesthesia, Set expectations for post-procedure period and Allowed opportunity for questions and acknowledgement of understanding      Vitals: (Last set prior to Anesthesia Care Transfer)    CRNA VITALS  11/10/2017 0800 - 11/10/2017 0835      11/10/2017             Pulse: 96    Ht Rate: 95    SpO2: 100 %    Resp Rate (set): 10                Electronically Signed By: LORETO Correa CRNA  November 10, 2017  8:35 AM

## 2017-11-29 ENCOUNTER — OFFICE VISIT (OUTPATIENT)
Dept: SURGERY | Facility: CLINIC | Age: 72
End: 2017-11-29
Payer: COMMERCIAL

## 2017-11-29 DIAGNOSIS — Z09 SURGERY FOLLOW-UP EXAMINATION: Primary | ICD-10-CM

## 2017-11-29 PROCEDURE — 99024 POSTOP FOLLOW-UP VISIT: CPT | Performed by: SURGERY

## 2017-11-29 NOTE — MR AVS SNAPSHOT
"              After Visit Summary   2017    Jose Alfredo Caceres    MRN: [id number]           Patient Information     Date Of Birth          1945        Visit Information        Provider Department      2017 1:15 PM Brennan Boyd MD [facility] [place] [facility] Hernia      Today's Diagnoses     Surgery follow-up examination    -  1       Follow-ups after your visit        Who to contact     If you have questions or need follow up information about today's clinic visit or your schedule please contact [facility] [place] directly at [phone].  Normal or non-critical lab and imaging results will be communicated to you by [employer], letter or phone within 4 business days after the clinic has received the results. If you do not hear from us within 7 days, please contact the clinic through [employer] or phone. If you have a critical or abnormal lab result, we will notify you by phone as soon as possible.  Submit refill requests through [employer] or call your pharmacy and they will forward the refill request to us. Please allow 3 business days for your refill to be completed.          Additional Information About Your Visit        MyChart Information     [employer] lets you send messages to your doctor, view your test results, renew your prescriptions, schedule appointments and more. To sign up, go to [email or web address] . Click on \"Log in\" on the left side of the screen, which will take you to the Welcome page. Then click on \"Sign up Now\" on the right side of the page.     You will be asked to enter the access code listed below, as well as some personal information. Please follow the directions to create your username and password.     Your access code is: [email or web address]  Expires: 2018 10:00 AM     Your access code will  in 90 days. If you need help or a new code, please call your [place] clinic or [phone].        Care EveryWhere ID     This is " your Care EveryWhere ID. This could be used by other organizations to access your [place] medical records  [id number]         Blood Pressure from Last 3 Encounters:   11/10/17 122/68   11/02/17 120/72    Weight from Last 3 Encounters:   11/10/17 161 lb 12.8 oz (73.4 kg)   11/02/17 159 lb (72.1 kg)              Today, you had the following     No orders found for display       Primary Care Provider Office Phone # Fax #    Kendell Navarrete [phone]       [address] [address]        Equal Access to Services     [facility]: Hadii aad kulwant hadasho Sograceali, waaxda luqadaha, qaybta kaalmada adeegyada, river pisano . So [facility] [phone].    ATENCIÓN: Si habla español, tiene a britt disposición servicios gratuitos de asistencia lingüística. [facility] [phone].    We comply with applicable federal civil rights laws and [place] laws. We do not discriminate on the basis of race, color, national origin, age, disability, sex, sexual orientation, or gender identity.            Thank you!     Thank you for choosing [facility] [place]  for your care. Our goal is always to provide you with excellent care. Hearing back from our patients is one way we can continue to improve our services. Please take a few minutes to complete the written survey that you may receive in the mail after your visit with us. Thank you!             Your Updated Medication List - Protect others around you: Learn how to safely use, store and throw away your medicines at www.disposemymeds.org.          This list is accurate as of: 11/29/17  1:57 PM.  Always use your most recent med list.                   Brand Name Dispense Instructions for use Diagnosis    ASPIRIN PO      Take by mouth as needed for moderate pain        IBUPROFEN PO      Take 400 mg by mouth as needed for moderate pain

## 2017-11-29 NOTE — LETTER
2017    Re: Jose Alfredo Caceres - 1945    Patient presents in follow-up after recent laparoscopic bilateral inguinal hernia repair.  He reports that he is feeling well.  He has returned to many normal activities.  He has minimal ongoing discomfort.  His bowel function is at baseline.     On examination: His incisions are clean and intact.  There is no evidence of infection or hernia recurrence.     Overall progressing nicely.  We discussed advancement of activity.  At this point I believe he can follow up on an as-needed basis.  His questions were all answered to his satisfaction.    Brennan Boyd M.D.  [facility]  [phone]

## 2017-11-30 NOTE — PROGRESS NOTES
Patient presents in follow-up after recent laparoscopic bilateral inguinal hernia repair.  He reports that he is feeling well.  He has returned to many normal activities.  He has minimal ongoing discomfort.  His bowel function is at baseline.    On examination: His incisions are clean and intact.  There is no evidence of infection or hernia recurrence.    Overall progressing nicely.  We discussed advancement of activity.  At this point I believe he can follow up on an as-needed basis.  His questions were all answered to his satisfaction.    Please route or send letter to:  Primary Care Provider (PCP)

## 2019-08-27 ENCOUNTER — HOSPITAL ENCOUNTER (INPATIENT)
Facility: CLINIC | Age: 74
LOS: 1 days | Discharge: HOME | DRG: 065 | End: 2019-08-28
Attending: EMERGENCY MEDICINE | Admitting: STUDENT IN AN ORGANIZED HEALTH CARE EDUCATION/TRAINING PROGRAM
Payer: COMMERCIAL

## 2019-08-27 ENCOUNTER — APPOINTMENT (OUTPATIENT)
Dept: CT IMAGING | Facility: CLINIC | Age: 74
DRG: 065 | End: 2019-08-27
Attending: EMERGENCY MEDICINE
Payer: COMMERCIAL

## 2019-08-27 ENCOUNTER — APPOINTMENT (OUTPATIENT)
Dept: MRI IMAGING | Facility: CLINIC | Age: 74
DRG: 065 | End: 2019-08-27
Attending: EMERGENCY MEDICINE
Payer: COMMERCIAL

## 2019-08-27 DIAGNOSIS — R53.1 RIGHT SIDED WEAKNESS: ICD-10-CM

## 2019-08-27 DIAGNOSIS — I63.00 CEREBROVASCULAR ACCIDENT (CVA) DUE TO THROMBOSIS OF PRECEREBRAL ARTERY (H): Primary | ICD-10-CM

## 2019-08-27 PROBLEM — I63.9 CEREBROVASCULAR ACCIDENT (H): Status: ACTIVE | Noted: 2019-08-27

## 2019-08-27 PROBLEM — K40.90 INGUINAL HERNIA, LEFT: Status: ACTIVE | Noted: 2017-10-17

## 2019-08-27 PROBLEM — I63.9 CVA (CEREBRAL VASCULAR ACCIDENT) (H): Status: ACTIVE | Noted: 2019-08-27

## 2019-08-27 LAB
ANION GAP SERPL CALCULATED.3IONS-SCNC: 5 MMOL/L (ref 3–14)
APTT PPP: 29 SEC (ref 22–37)
BASOPHILS # BLD AUTO: 0 10E9/L (ref 0–0.2)
BASOPHILS NFR BLD AUTO: 0.3 %
BUN SERPL-MCNC: 13 MG/DL (ref 7–30)
CALCIUM SERPL-MCNC: 9.2 MG/DL (ref 8.5–10.1)
CHLORIDE SERPL-SCNC: 105 MMOL/L (ref 94–109)
CO2 SERPL-SCNC: 29 MMOL/L (ref 20–32)
CREAT SERPL-MCNC: 1.05 MG/DL (ref 0.66–1.25)
DIFFERENTIAL METHOD BLD: NORMAL
EOSINOPHIL # BLD AUTO: 0.1 10E9/L (ref 0–0.7)
EOSINOPHIL NFR BLD AUTO: 1.6 %
ERYTHROCYTE [DISTWIDTH] IN BLOOD BY AUTOMATED COUNT: 13.5 % (ref 10–15)
GFR SERPL CREATININE-BSD FRML MDRD: 70 ML/MIN/{1.73_M2}
GLUCOSE SERPL-MCNC: 99 MG/DL (ref 70–99)
HCT VFR BLD AUTO: 44.2 % (ref 40–53)
HGB BLD-MCNC: 15 G/DL (ref 13.3–17.7)
IMM GRANULOCYTES # BLD: 0 10E9/L (ref 0–0.4)
IMM GRANULOCYTES NFR BLD: 0.1 %
INR PPP: 0.98 (ref 0.86–1.14)
LYMPHOCYTES # BLD AUTO: 1.3 10E9/L (ref 0.8–5.3)
LYMPHOCYTES NFR BLD AUTO: 19.3 %
MCH RBC QN AUTO: 30.1 PG (ref 26.5–33)
MCHC RBC AUTO-ENTMCNC: 33.9 G/DL (ref 31.5–36.5)
MCV RBC AUTO: 89 FL (ref 78–100)
MONOCYTES # BLD AUTO: 0.4 10E9/L (ref 0–1.3)
MONOCYTES NFR BLD AUTO: 5.3 %
NEUTROPHILS # BLD AUTO: 5 10E9/L (ref 1.6–8.3)
NEUTROPHILS NFR BLD AUTO: 73.4 %
NRBC # BLD AUTO: 0 10*3/UL
NRBC BLD AUTO-RTO: 0 /100
PLATELET # BLD AUTO: 236 10E9/L (ref 150–450)
POTASSIUM SERPL-SCNC: 4.2 MMOL/L (ref 3.4–5.3)
RBC # BLD AUTO: 4.98 10E12/L (ref 4.4–5.9)
SODIUM SERPL-SCNC: 139 MMOL/L (ref 133–144)
TROPONIN I SERPL-MCNC: <0.015 UG/L (ref 0–0.04)
WBC # BLD AUTO: 6.8 10E9/L (ref 4–11)

## 2019-08-27 PROCEDURE — 85025 COMPLETE CBC W/AUTO DIFF WBC: CPT | Performed by: EMERGENCY MEDICINE

## 2019-08-27 PROCEDURE — 25000132 ZZH RX MED GY IP 250 OP 250 PS 637: Performed by: STUDENT IN AN ORGANIZED HEALTH CARE EDUCATION/TRAINING PROGRAM

## 2019-08-27 PROCEDURE — 25000128 H RX IP 250 OP 636: Performed by: EMERGENCY MEDICINE

## 2019-08-27 PROCEDURE — 70553 MRI BRAIN STEM W/O & W/DYE: CPT

## 2019-08-27 PROCEDURE — 70450 CT HEAD/BRAIN W/O DYE: CPT

## 2019-08-27 PROCEDURE — 84484 ASSAY OF TROPONIN QUANT: CPT | Performed by: EMERGENCY MEDICINE

## 2019-08-27 PROCEDURE — 99223 1ST HOSP IP/OBS HIGH 75: CPT | Performed by: PSYCHIATRY & NEUROLOGY

## 2019-08-27 PROCEDURE — 0042T CT HEAD PERFUSION WITH CONTRAST: CPT

## 2019-08-27 PROCEDURE — 93005 ELECTROCARDIOGRAM TRACING: CPT

## 2019-08-27 PROCEDURE — 25500064 ZZH RX 255 OP 636: Performed by: EMERGENCY MEDICINE

## 2019-08-27 PROCEDURE — 85610 PROTHROMBIN TIME: CPT | Performed by: EMERGENCY MEDICINE

## 2019-08-27 PROCEDURE — 85730 THROMBOPLASTIN TIME PARTIAL: CPT | Performed by: EMERGENCY MEDICINE

## 2019-08-27 PROCEDURE — A9585 GADOBUTROL INJECTION: HCPCS | Performed by: EMERGENCY MEDICINE

## 2019-08-27 PROCEDURE — 99285 EMERGENCY DEPT VISIT HI MDM: CPT | Mod: 25

## 2019-08-27 PROCEDURE — 25000132 ZZH RX MED GY IP 250 OP 250 PS 637: Performed by: EMERGENCY MEDICINE

## 2019-08-27 PROCEDURE — 80048 BASIC METABOLIC PNL TOTAL CA: CPT | Performed by: EMERGENCY MEDICINE

## 2019-08-27 PROCEDURE — 99223 1ST HOSP IP/OBS HIGH 75: CPT | Mod: AI | Performed by: STUDENT IN AN ORGANIZED HEALTH CARE EDUCATION/TRAINING PROGRAM

## 2019-08-27 PROCEDURE — 70498 CT ANGIOGRAPHY NECK: CPT

## 2019-08-27 PROCEDURE — 12000000 ZZH R&B MED SURG/OB

## 2019-08-27 PROCEDURE — 25000125 ZZHC RX 250: Performed by: EMERGENCY MEDICINE

## 2019-08-27 RX ORDER — ASPIRIN 81 MG/1
324 TABLET, CHEWABLE ORAL ONCE
Status: COMPLETED | OUTPATIENT
Start: 2019-08-27 | End: 2019-08-27

## 2019-08-27 RX ORDER — ONDANSETRON 4 MG/1
4 TABLET, ORALLY DISINTEGRATING ORAL EVERY 6 HOURS PRN
Status: DISCONTINUED | OUTPATIENT
Start: 2019-08-27 | End: 2019-08-28 | Stop reason: HOSPADM

## 2019-08-27 RX ORDER — IOPAMIDOL 755 MG/ML
120 INJECTION, SOLUTION INTRAVASCULAR ONCE
Status: COMPLETED | OUTPATIENT
Start: 2019-08-27 | End: 2019-08-27

## 2019-08-27 RX ORDER — ONDANSETRON 2 MG/ML
4 INJECTION INTRAMUSCULAR; INTRAVENOUS EVERY 6 HOURS PRN
Status: DISCONTINUED | OUTPATIENT
Start: 2019-08-27 | End: 2019-08-28 | Stop reason: HOSPADM

## 2019-08-27 RX ORDER — ACETAMINOPHEN 325 MG/1
650 TABLET ORAL EVERY 4 HOURS PRN
Status: DISCONTINUED | OUTPATIENT
Start: 2019-08-27 | End: 2019-08-28 | Stop reason: HOSPADM

## 2019-08-27 RX ORDER — NALOXONE HYDROCHLORIDE 0.4 MG/ML
.1-.4 INJECTION, SOLUTION INTRAMUSCULAR; INTRAVENOUS; SUBCUTANEOUS
Status: DISCONTINUED | OUTPATIENT
Start: 2019-08-27 | End: 2019-08-28 | Stop reason: HOSPADM

## 2019-08-27 RX ORDER — GADOBUTROL 604.72 MG/ML
7 INJECTION INTRAVENOUS ONCE
Status: COMPLETED | OUTPATIENT
Start: 2019-08-27 | End: 2019-08-27

## 2019-08-27 RX ORDER — LIDOCAINE 40 MG/G
CREAM TOPICAL
Status: DISCONTINUED | OUTPATIENT
Start: 2019-08-27 | End: 2019-08-28 | Stop reason: HOSPADM

## 2019-08-27 RX ORDER — CLOPIDOGREL BISULFATE 75 MG/1
75 TABLET ORAL DAILY
Status: DISCONTINUED | OUTPATIENT
Start: 2019-08-28 | End: 2019-08-28 | Stop reason: HOSPADM

## 2019-08-27 RX ORDER — ASPIRIN 81 MG/1
81 TABLET ORAL DAILY
Status: DISCONTINUED | OUTPATIENT
Start: 2019-08-28 | End: 2019-08-28 | Stop reason: HOSPADM

## 2019-08-27 RX ORDER — LABETALOL HYDROCHLORIDE 5 MG/ML
10-40 INJECTION, SOLUTION INTRAVENOUS EVERY 10 MIN PRN
Status: DISCONTINUED | OUTPATIENT
Start: 2019-08-27 | End: 2019-08-28 | Stop reason: HOSPADM

## 2019-08-27 RX ORDER — ATORVASTATIN CALCIUM 40 MG/1
40 TABLET, FILM COATED ORAL
Status: DISCONTINUED | OUTPATIENT
Start: 2019-08-28 | End: 2019-08-28 | Stop reason: HOSPADM

## 2019-08-27 RX ORDER — CLOPIDOGREL 300 MG/1
300 TABLET, FILM COATED ORAL ONCE
Status: COMPLETED | OUTPATIENT
Start: 2019-08-27 | End: 2019-08-27

## 2019-08-27 RX ADMIN — CLOPIDOGREL BISULFATE 300 MG: 300 TABLET, FILM COATED ORAL at 18:37

## 2019-08-27 RX ADMIN — ASPIRIN 81 MG: 81 TABLET, COATED ORAL at 20:49

## 2019-08-27 RX ADMIN — GADOBUTROL 7 ML: 604.72 INJECTION INTRAVENOUS at 19:41

## 2019-08-27 RX ADMIN — SODIUM CHLORIDE 100 ML: 9 INJECTION, SOLUTION INTRAVENOUS at 17:31

## 2019-08-27 RX ADMIN — ASPIRIN 81 MG 324 MG: 81 TABLET ORAL at 18:15

## 2019-08-27 RX ADMIN — IOPAMIDOL 120 ML: 755 INJECTION, SOLUTION INTRAVENOUS at 17:31

## 2019-08-27 ASSESSMENT — MIFFLIN-ST. JEOR: SCORE: 1435.72

## 2019-08-27 ASSESSMENT — ENCOUNTER SYMPTOMS
SPEECH DIFFICULTY: 1
FACIAL ASYMMETRY: 1
SHORTNESS OF BREATH: 0

## 2019-08-27 NOTE — H&P
[facility]    History and Physical - Hospitalist Service       Date of Admission:  8/27/2019    Assessment & Plan   Jose Alfredo Caceres is a 74 year old male admitted on 8/27/2019. He presents with right sided weakness concerning for a CVA.      Cerebrovascular accident (H) vs TIA?    Assessment: Presents with 1 day history of right-sided upper and lower extremity weakness without sensory deficits.  Initial imaging including CT head perfusion, CTA head and neck, and CT head without contrast showed no focal perfusion deficit, and CT angiogram was unremarkable overall.  Stroke code was called in the emergency department, patient was evaluated by stroke neuro who suspects that patient may have had a likely left IC/subcortical or pontine small ischemic stroke.  Overall patient has returned to his baseline neuro status, there is no focal deficits on his neuro exam.  Was Plavix loaded in the emergency department/was given aspirin 324 mg.    Plan:   - admit to Neuro inpatient  - Stroke Neuro consulted  - MRI brain W and W/O  - permissive htn, labetalol as needed for SBP > 220  - PT/OT/SLP  - Check A1c/Lipid panel  - Telemetry  - ECHO in AM  - Nursing swallow eval  - ASA 81 mg daily  - Plavix 75 mg daily      Hyperlipidemia    Assessment/Plan: Check lipid panel in AM, Lipitor 40 mg started.      Back Pain    Assessment/Plan: chronic in nature. Controlled with tylenol as needed       Diet: Pending nursing swallow  DVT Prophylaxis: Pneumatic Compression Devices/PLAVIX  Mason Catheter: not present  Code Status: FULL CODE    Disposition Plan   Expected discharge: 2 - 3 days, recommended to prior living arrangement once safe disposition plan/ TCU bed available and neurology work up completed.  Entered: Sebastian Abdullahi MD 08/27/2019, 6:22 PM     The patient's care was discussed with the Patient and ED Provider.    Sebastian Abdullahi MD  [facility]  Hospital    ______________________________________________________________________    Chief Complaint     Right Sided Weakness    History is obtained from the patient    History of Present Illness   Jose Alfredo Caceres is a 74 year old male with past medical history of hyperlipidemia, and frequent doctoring who presents for evaluation of right-sided weakness.    Patient reports that he has had right-sided weakness and dizziness starting the morning prior to admission [place] cabin.  He initially was concerned about tickborne illnesses given he has had several tick bites this summer.  He actually woke up on the day of admission feeling relatively well, but around roughly 2 PM on the day of admission he started having slurred speech, felt significant difficulty saying the right words, and noticed that his handwriting was significantly more sloppy than baseline.  Given the persistent symptoms he decided to seek evaluation at urgent care who referred him to the emergency department as they were concerned about a possible stroke.  He otherwise denies any recent chest pain or shortness of breath, he denies any diaphoresis, he denies any headaches or vision changes, no facial droop was noted by family.  He denies any numbness of extremities.  He denies any recent head trauma, he denies any blood in stool or weight loss.  He is a former smoker,, he denies any previous history of strokes.  He denies any recent blood in his urine, no urgency/frequency or dysuria.  He denies any new rashes.  He otherwise has no other complaints this time.  He frequently takes an aspirin, otherwise has no history of cardiac disease.    Review of Systems    The 10 point Review of Systems is negative other than noted in the HPI or here.     Past Medical History    I have reviewed this patient's medical history and updated it with pertinent information if needed.   Past Medical History:   Diagnosis Date     Back pain      Elevated  C-reactive protein (CRP)      Fracture of lower leg     ski accident     High cholesterol      Homocysteinemia (H)      Hyperlipidemia      Inguinal hernia, left        Past Surgical History   I have reviewed this patient's surgical history and updated it with pertinent information if needed.  Past Surgical History:   Procedure Laterality Date     GENITOURINARY SURGERY      vasectomy     HEAD & NECK SURGERY      wisdom teeth extraction     LAPAROSCOPIC HERNIORRHAPHY INGUINAL Bilateral 11/10/2017    Procedure: LAPAROSCOPIC HERNIORRHAPHY INGUINAL;  labilateral inguinal hernia with mesh;  Surgeon: Brennan Boyd MD;  Location:  OR     LAPAROSCOPIC HERNIORRHAPHY INGUINAL BILATERAL Bilateral 11/10/2017    Procedure: LAPAROSCOPIC HERNIORRHAPHY INGUINAL BILATERAL;  WITH MESH;  Surgeon: Brennan Boyd MD;  Location:  OR     ORTHOPEDIC SURGERY Right 2017    Foot      ORTHOPEDIC SURGERY      tibia fracture surgery       Social History   I have reviewed this patient's social history and updated it with pertinent information if needed.  Social History     Tobacco Use     Smoking status: Former Smoker     Last attempt to quit: 1968     Years since quittin.6     Smokeless tobacco: Never Used   Substance Use Topics     Alcohol use: Yes     Comment: 1 glass wine per day     Drug use: No       Family History   I have reviewed this patient's family history and updated it with pertinent information if needed.   Family History   Problem Relation Age of Onset     Other Cancer Mother      Prior to Admission Medications   Prior to Admission Medications   Prescriptions Last Dose Informant Patient Reported? Taking?   ASPIRIN PO   Yes No   Sig: Take by mouth as needed for moderate pain   IBUPROFEN PO   Yes No   Sig: Take 400 mg by mouth as needed for moderate pain      Facility-Administered Medications: None     Allergies   No Known Allergies    Physical Exam   Vital Signs: Temp: 98.6  F (37  C) Temp src:  Oral BP: 137/74 Pulse: 71 Heart Rate: 73 Resp: 12 SpO2: 96 % O2 Device: None (Room air)    Weight: 159 lbs 0 oz    Constitutional: awake, alert, cooperative, no apparent distress.   Eyes: Lids and lashes normal, pupils equal, round and reactive to light   ENT: Normocephalic, without obvious abnormality, atraumatic, sinuses nontender on palpation   Hematologic / Lymphatic: no cervical lymphadenopathy   Respiratory: CTABL   Cardiovascular: RRR with no m/r/g   GI: Normal bowel sounds, soft, non-distended, non-tender.   Skin: normal skin color, texture, turgor   Musculoskeletal: There is no redness, warmth, or swelling of the joints. Full range of motion noted.   Neurologic: Awake, alert, oriented to name, place and time. Cranial nerves II-XII are grossly intact. Motor is 5 out of 5 bilaterally. Sensory is intact. Speech is fluent and coherent. Negative Romberg. Rapid alternating movements preserved. Finger to nose preserved.   Neuropsychiatric: normal mood and affect      Data   Data reviewed today: I reviewed all medications, new labs and imaging results over the last 24 hours. I personally reviewed the EKG tracing showing NSR and the head CT image(s) showing see below.    Most Recent 3 CBC's:  Recent Labs   Lab Test 08/27/19  1720   WBC 6.8   HGB 15.0   MCV 89        Most Recent 3 BMP's:  Recent Labs   Lab Test 08/27/19  1720      POTASSIUM 4.2   CHLORIDE 105   CO2 29   BUN 13   CR 1.05   ANIONGAP 5   KIANNA 9.2   GLC 99     Most Recent 2 LFT's:No lab results found.  Most Recent 3 INR's:  Recent Labs   Lab Test 08/27/19  1720   INR 0.98     Most Recent 3 Troponin's:  Recent Labs   Lab Test 08/27/19  1720   TROPI <0.015     Recent Results (from the past 24 hour(s))   CT Head w/o Contrast    Narrative    CT SCAN OF THE HEAD WITHOUT CONTRAST   8/27/2019 5:28 PM     HISTORY: Code Stroke    TECHNIQUE: Axial images of the head and coronal reformations without  IV contrast material. Radiation dose for this scan  was reduced using  automated exposure control, adjustment of the mA and/or kV according  to patient size, or iterative reconstruction technique.    COMPARISON: None.    FINDINGS: The cerebral hemispheres, brainstem, and cerebellum  demonstrate normal morphology and attenuation. No evidence of acute  ischemia, hemorrhage, mass, mass effect, or hydrocephalus. The  visualized calvarium, tympanic cavities, mastoid cavities, and  paranasal sinuses are unremarkable.      Impression    IMPRESSION: No evidence of acute ischemia or hemorrhage.    Findings were discussed by phone between Dr. Washington and Dr. Montalvo at  5:30 PM on 8/27/2019.    PATI WASHINGTON MD   CTA Head Neck with Contrast    Narrative    CT ANGIOGRAM OF THE HEAD AND NECK WITH CONTRAST  8/27/2019 5:31 PM     HISTORY: Code Stroke     TECHNIQUE: CT angiography with an injection of 70 mL Isovue-370 IV  with scans through the head and neck. Images were transferred to a  separate 3-D workstation where multiplanar reformations and 3-D images  were created. Estimates of carotid stenoses are made relative to the  distal internal carotid artery diameters except as noted. Radiation  dose for this scan was reduced using automated exposure control,  adjustment of the mA and/or kV according to patient size, or iterative  reconstruction technique.    COMPARISON: None.     CT ANGIOGRAM HEAD FINDINGS: The intracranial vertebral arteries,  basilar artery, and posterior cerebral arteries are patent. The  internal carotid arteries, anterior cerebral arteries, and middle  cerebral arteries are patent. No evidence of large vessel occlusion.  Scattered moderate plaquing is present. No aneurysms are identified.     CT ANGIOGRAM NECK FINDINGS: A three-vessel aortic arch is present. The  bilateral common carotid, internal carotid, external carotid, and  vertebral arteries are patent. No evidence of dissection, occlusion,  or flow-limiting stenosis. High attenuation along the left  vertebral  artery at the V2-3 junction is likely artifactual related to streak  artifact from dental amalgam.     Heterogeneous thyroid gland enhancement is present with subcentimeter  nodules for which no dedicated imaging follow-up is required. Moderate  cervical spine degenerative change is present.      Impression    IMPRESSION:   CTA Head: Unremarkable.  CTA Neck: Unremarkable.   CT Head Perfusion w Contrast    Narrative    CT BRAIN PERFUSION 8/27/2019 5:45 PM    HISTORY: Code Stroke    TECHNIQUE: Time sequential axial CT images of the head were acquired  during the administration of 50 mL Isovue-370 IV. Color perfusion maps  of the brain were created from this time sequential axial source data.      Radiation dose for this scan was reduced using automated exposure  control, adjustment of the mA and/or kV according to patient size, or  iterative reconstruction technique.    COMPARISON: None.    FINDINGS: Blood volumes, transit times, and flow maps demonstrate  symmetric perfusion bilaterally without evidence of focal perfusion  defect.      Impression    IMPRESSION: Unremarkable.    PATI VU MD

## 2019-08-27 NOTE — CONSULTS
[facility]      Stroke Consult Note    Reason for Consult:  Stroke Code    HPI  Jose Alfredo Caceres is a 74 year old male who presents with right sided weakness. He states that he noticed around 9 am 1 day prior to admission that he had right sided weakness and had difficulty walking. He did not move much rest of the day including today so he does not really note how weak he was. He tried walking again today and noticed right sided weakness once again. He denies similar symptoms in the past. He has no severe headache. He denies numbness, pain in ext/back/neck, or palpitations.    He takes infrequent aspirin at home. He does not see doctors often.    TPA Treatment   Not given due to outside the time window.    Endovascular Treatment  Not initiated due to absence of proximal vessel occlusion    Impression  Likely left IC/subcortical or pontine small ischemic stroke due to     Recommendations  -Aspirin 81 mg once daily  -Plavix 300 mg once then 75 mg thereafter  -Lipitor 40 mg once daily  -Permissive HTN for now  -MRI brain  -TTE, tele, trops  -Lipid profile, HbA1C  -PT/OT/ST    Patient Follow-up    - final recommendation pending work-up      Please contact the Stroke Service with any questions.    Miller Bain MD  Neurology    Text Page ([phone])  __________________________________________________    Past Medical History:   Diagnosis Date     Back pain      Elevated C-reactive protein (CRP)      Fracture of lower leg     ski accident     High cholesterol      Homocysteinemia (H)      Hyperlipidemia      Inguinal hernia, left        Past Surgical History:   Procedure Laterality Date     GENITOURINARY SURGERY      vasectomy     HEAD & NECK SURGERY      wisdom teeth extraction     LAPAROSCOPIC HERNIORRHAPHY INGUINAL Bilateral 11/10/2017    Procedure: LAPAROSCOPIC HERNIORRHAPHY INGUINAL;  labilateral inguinal hernia with mesh;  Surgeon: Brennan Boyd MD;  Location:  OR     LAPAROSCOPIC HERNIORRHAPHY  INGUINAL BILATERAL Bilateral 11/10/2017    Procedure: LAPAROSCOPIC HERNIORRHAPHY INGUINAL BILATERAL;  WITH MESH;  Surgeon: Brennan Boyd MD;  Location: [facility] OR     ORTHOPEDIC SURGERY Right 2017    Foot      ORTHOPEDIC SURGERY      tibia fracture surgery       Medications   No current facility-administered medications for this encounter.      Current Outpatient Medications   Medication Sig     ASPIRIN PO Take by mouth as needed for moderate pain     IBUPROFEN PO Take 400 mg by mouth as needed for moderate pain         (Not in a hospital admission)    Allergies   No Known Allergies    Family History   Family History     Problem (# of Occurrences) Relation (Name,Age of Onset)    Other Cancer (1) Mother          Social History   Social History     Socioeconomic History     Marital status:      Spouse name: Not on file     Number of children: Not on file     Years of education: Not on file     Highest education level: Not on file   Occupational History     Not on file   Social Needs     Financial resource strain: Not on file     Food insecurity:     Worry: Not on file     Inability: Not on file     Transportation needs:     Medical: Not on file     Non-medical: Not on file   Tobacco Use     Smoking status: Former Smoker     Last attempt to quit: 1968     Years since quittin.6     Smokeless tobacco: Never Used   Substance and Sexual Activity     Alcohol use: Yes     Comment: 1 glass wine per day     Drug use: No     Sexual activity: Not on file   Lifestyle     Physical activity:     Days per week: Not on file     Minutes per session: Not on file     Stress: Not on file   Relationships     Social connections:     Talks on phone: Not on file     Gets together: Not on file     Attends Christian service: Not on file     Active member of club or organization: Not on file     Attends meetings of clubs or organizations: Not on file     Relationship status: Not on file     Intimate partner violence:      Fear of current or ex partner: Not on file     Emotionally abused: Not on file     Physically abused: Not on file     Forced sexual activity: Not on file   Other Topics Concern     Parent/sibling w/ CABG, MI or angioplasty before 65F 55M? Not Asked   Social History Narrative     Not on file          ROS  The 10 point Review of Systems is negative other than noted in the HPI or here.     PHYSICAL EXAMINATION  B/P:     (!) 160/85 (08/27/19 1718)    Heart Rate: 64 (08/27/19 1718)    Pulse: 64 (08/27/19 1718)   Resp:  20 (08/27/19 1718)  Temp: 98.6  F (37  C)   Oral (08/27/19 1718)    General:  patient lying in bed without any acute distress    HEENT:  normocephalic/atraumatic  Cardio:  RRR  Pulmonary:  no respiratory distress  Abdomen:  soft  Extremities:  no edema  Skin:  intact     Neurologic  Mental Status:  alert, oriented x 3, follows commands, speech clear and fluent, naming and repetition normal  Cranial Nerves:  visual fields intact, PERRL, EOMI with normal smooth pursuit, facial sensation intact and symmetric, facial movements symmetric, hearing not formally tested but intact to conversation, palate elevation symmetric and uvula midline, no dysarthria, shoulder shrug strong bilaterally, tongue protrusion midline  Motor:  Right arm pronator drift with mild weakness noted of right leg, left sided full strength  Reflexes:  toes down-going  Sensory:  light touch sensation intact and symmetric throughout upper and lower extremities, no extinction on double simultaneous stimulation   Coordination:  normal finger-to-nose and heel-to-shin bilaterally without dysmetria, rapid alternating movements symmetric  Station/Gait:  deferred    Stroke Scales      NIHSS  Interval baseline (08/27/19 1759)   Interval Comments     1a. Level of Consciousness 0-->Alert, keenly responsive   1b. LOC Questions 0-->Answers both questions correctly   1c. LOC Commands 0-->Performs both tasks correctly   2.   Best Gaze 0-->Normal   3.    Visual 0-->No visual loss   4.   Facial Palsy 0-->Normal symmetrical movements   5a. Motor Arm, Left 1-->Drift, limb holds 90 (or 45) degrees, but drifts down before full 10 seconds, does not hit bed or other support   5b. Motor Arm, Right 0-->No drift, limb holds 90 (or 45) degrees for full 10 secs   6a. Motor Leg, Left 0-->No drift, leg holds 30 degree position for full 5 secs   6b. Motor Leg, right 0-->No drift, leg holds 30 degree position for full 5 secs   7.   Limb Ataxia 0-->Absent   8.   Sensory 0-->Normal, no sensory loss   9.   Best Language 0-->No aphasia, normal   10. Dysarthria 0-->Normal   11. Extinction and Inattention  0-->No abnormality   Total 1 (08/27/19 1759)       Labs/Imaging  Labs and imaging were reviewed and used in developing plan; pertinent results included.  Data   CBC  WBC (10e9/L)   Date Value   08/27/2019 6.8    RBC Count (10e12/L)   Date Value   08/27/2019 4.98    Hemoglobin (g/dL)   Date Value   08/27/2019 15.0      Hematocrit (%)   Date Value   08/27/2019 44.2    Platelet Count (10e9/L)   Date Value   08/27/2019 236         BMP  Sodium (mmol/L)   Date Value   08/27/2019 139    Potassium (mmol/L)   Date Value   08/27/2019 4.2    Chloride (mmol/L)   Date Value   08/27/2019 105      Carbon Dioxide (mmol/L)   Date Value   08/27/2019 29    Glucose (mg/dL)   Date Value   08/27/2019 99    Urea Nitrogen (mg/dL)   Date Value   08/27/2019 13      Creatinine (mg/dL)   Date Value   08/27/2019 1.05    Calcium (mg/dL)   Date Value   08/27/2019 9.2         INR Troponin I A1C   INR (no units)   Date Value   08/27/2019 0.98    Troponin I ES (ug/L)   Date Value   08/27/2019 <0.015    No results found for: A1C     Liver Panel  No results found for: PROTTOTAL No results found for: ALBUMIN No results found for: BILITOTAL   No results found for: ALKPHOS No results found for: AST No results found for: ALT   No results found for: BILIDIRECT       Lipid Profile  No results found for: CHOL No results found  for: HDL No results found for: LDL   No results found for: TRIG No results found for: CHOLHDLRATIO            I have personally spent a total of 50 minutes providing care and consulting with this patient's medical providers today, with more than 50% of this time spent in consultation, coordination of care, and discussion with the patient and/or family regarding diagnostic results, prognosis, symptom management, risks and benefits of management options, and development of plan of care..     Stroke Code Data  (for stroke code without tele)  Stroke code activated 08/27/19   1711   First stroke provider response 08/27/19   1715   Last known normal 08/26/19   0930   Time of discovery   (or onset of symptoms) 08/26/19   0930   Head CT read by me 08/27/19   1728   Was stroke code de-escalated? Yes 08/27/19 1751  patient is outside emergent treatment time parameters

## 2019-08-27 NOTE — PHARMACY-ADMISSION MEDICATION HISTORY
Admission medication history interview status for the 8/27/2019  admission is complete. See EPIC admission navigator for prior to admission medications     Medication history source reliability:Good    Actions taken by pharmacist (provider contacted, etc): Chart review and discussed with patient.     Additional medication history information not noted on PTA med list : Patient indicates he only takes apirin and ibuprofen prn for pain.    Medication reconciliation/reorder completed by provider prior to medication history? Yes    Time spent in this activity: 5 minutes    Prior to Admission medications    Medication Sig Last Dose Taking? Auth Provider   aspirin (ASA) 325 MG EC tablet Take 650 mg by mouth as needed for moderate pain prn Yes Unknown, Entered By History   ibuprofen (ADVIL/MOTRIN) 200 MG tablet Take 400 mg by mouth as needed for moderate pain  prn Yes Reported, Patient

## 2019-08-27 NOTE — ED TRIAGE NOTES
Pt presents from clinic with concerns for slurred speech and complaints of having TIA like symptoms yesterday. Pt reports yesterday he had intermittent right sided weakness. Pt also states he signed his signature in clinic and his writing appeared to be abnormal. Pt has no obvious deficits in ED. Pt is not on blood thinners. Last known normal speech per EMS was 1100

## 2019-08-27 NOTE — ED PROVIDER NOTES
"  History     Chief Complaint:  Slurred Speech    HPI   Jose Alfredo Caceres is a 74 year old male who presents after presenting to clinic with TIA like symptoms. The patient has been having intermittent right sided weakness in his arm and legs. He woke up today feeling well, but around 2 PM this afternoon he started having slurred speech and he could feel himself saying the right words but was having a hard time getting the words out. He also notes his writing ability was significantly changed. The patient denies chest pain or shortness of breath.     Allergies:  No Known Allergies     Medications:    Aspirin     Past Medical History:    Back pain   Elevated C-reactive protein (CRP)   Fracture of lower leg   Homocysteinemia     Hyperlipidemia   Inguinal hernia, left     Past Surgical History:    Vasectomy   Hume tooth extraction   Herniorrhaphy    foot surgery   Orthopedic surgery     Family History:    Mother: Cancer    Social History:  Smoking Status: Former Smoker  Smokeless Tobacco: Never Used  Alcohol Use: Positive    Drug use: Negative    Marital Status:       Review of Systems   Respiratory: Negative for shortness of breath.    Cardiovascular: Negative for chest pain.   Neurological: Positive for facial asymmetry and speech difficulty.   All other systems reviewed and are negative.      Physical Exam     Patient Vitals for the past 24 hrs:   BP Temp Temp src Pulse Heart Rate Resp SpO2 Height Weight   08/27/19 1718 (!) 160/85 98.6  F (37  C) Oral 64 64 20 98 % 1.727 m (5' 8\") 72.1 kg (159 lb)      Physical Exam  Constitutional: Well developed, nontox appearance  Head: Atraumatic.   Mouth/Throat: Oropharynx is clear and moist.   Neck:  no stridor  Eyes: no scleral icterus, PERRL, EOMI, visual fields intact  Cardiovascular: RRR, 2+ bilat radial pulses  Pulmonary/Chest: nml resp effort, Clear BS bilat  Abdominal: ND, soft, NT, no rebound or guarding   Ext: Warm, well perfused, no edema  Neurological: A&O,  " CNII-XII intact, nml finger to nose, 5/5 strength throughout upper and lower ext, symmetric; sensation grossly intact,  Skin: Skin is warm and dry.   Psychiatric: Behavior is normal. Thought content normal.   Nursing note and vitals reviewed.        Emergency Department Course     ECG:  ECG taken at 1749, ECG read at 1755  Normal sinus rhythm with sinus arrhythmia  Normal ECG  Rate 60 bpm. KS interval 156 ms. QRS duration 84 ms. QT/QTc 412/412 ms. P-R-T axes 55 39 56.  No significant change when compared to EKG dated 8/2/17.     Imaging:  Radiology findings were communicated with the patient who voiced understanding of the findings.    CT Head Perfusion w Contrast   Final Result   IMPRESSION: Unremarkable.      PATI WASHINGTON MD      CTA Head Neck with Contrast   Final Result   IMPRESSION:    CTA Head: Unremarkable.   CTA Neck: Unremarkable.      PATI WASHINGTON MD      CT Head w/o Contrast   Final Result   IMPRESSION: No evidence of acute ischemia or hemorrhage.      Findings were discussed by phone between Dr. Washington and Dr. Montalvo at   5:30 PM on 8/27/2019.      PATI WASHINGTON MD      MR Brain w/o & w Contrast    (Results Pending)     Laboratory:  Laboratory findings were communicated with the patient who voiced understanding of the findings.    CBC: WBC 6.8, HGB 15,   BMP: AWNL (Creatinine 1.05)  Troponin (Collected 1720): <0.015  PTT: 29  INR: 0.98     Procedures:  Procedures   None    Interventions:  1815 Aspirin 324 mg PO  1837 Plavix 300 mg PO  Medications   aspirin EC tablet 81 mg (has no administration in time range)   Saline Flush - CT (100 mLs Intravenous Given 8/27/19 1731)   iopamidol (ISOVUE-370) solution 120 mL (120 mLs Intravenous Given 8/27/19 1731)   aspirin (ASA) chewable tablet 324 mg (324 mg Oral Given 8/27/19 1815)   clopidogrel (PLAVIX) tablet 300 mg (300 mg Oral Given 8/27/19 1837)     Emergency Department Course:    1715 Nursing notes and vitals reviewed.    1716 I performed an  exam of the patient as documented above.     1716 Code stroke called    1720 I spoke with Dr. Bain of the Stroke Neurology service regarding patient's presentation, findings, and plan of care.      1720 IV was inserted and blood was drawn for laboratory testing, results above.     1726 The patient was sent for a head CT while in the emergency department, results above.       1726 The patient was sent for a CT head perfusion while in the emergency department, results above.      1726 The patient was sent for a CTA head neck while in the emergency department, results above.      1730 I spoke with Dr. Washington of the Radiology service regarding patient's presentation, findings, and plan of care.     1749 EKG obtained as noted above.     1821 I spoke with Dr. Abdullahi of the Hospitalist service regarding patient's presentation, findings, and plan of care.          Impression & Plan      Medical Decision Making:  Jose Alfredo Caceres is a 74 year old male who presents to the emergency department today for evaluation of intermittent slurred speech, subj RUE weakness    Differential diagnosis includes CVA, TIA, hemorrhage, complex migraine.  Given the waxing and waning symptoms, stroke code called with imaging as noted above.  CT imaging negative for acute CVA.  Stroke neurology evaluated the patient in the emergency department with recommendations that the patient may have had will include her infarct.  MRI ordered and pending at time of admission.  Patient was given aspirin and Plavix in the emergency department and subsequently admitted to the hospitalist service for further evaluation and management.  Patient counseled on the results, diagnosis and disposition.  He was admitted in stable condition    Diagnosis:    ICD-10-CM    1. Right sided weakness R53.1      Disposition:   The patient is admitted into the care of Dr. Abdullahi.       CMS Diagnoses: The patient has stroke symptoms:           ED Stroke specific documentation            NIHSS PDF          Protocol PDF     Patient last known well time: 1100  ED Provider first to bedside at: 1712  CT Results received as noted in chart    tPA:   Not given due to minor / isolated / quickly resolving stroke symptoms.    If treating with tPA: Ensure SBP<185 and DBP<105 prior to treatment with IV tPA.  Administering IV tPA after treatment with IV labetalol, hydralazine, or nicardipine is reasonable once BP control is established.    Endovascular Retrieval:  Not initiated due to absence of proximal vessel occlusion    National [employer] Stroke Scale (Baseline)  Time Performed: upon arrival      Score    Level of consciousness: (0)   Alert, keenly responsive    LOC questions: (0)   Answers both questions correctly    LOC commands: (0)   Performs both tasks correctly    Best gaze: (0)   Normal    Visual: (0)   No visual loss    Facial palsy: (0)   Normal symmetrical movements    Motor arm (left): (0)   No drift    Motor arm (right): (0)   No drift    Motor leg (left): (0)   No drift    Motor leg (right): (0)   No drift    Limb ataxia: (0)   Absent    Sensory: (0)   Normal- no sensory loss    Best language: (0)   Normal- no aphasia    Dysarthria: (0)   Normal    Extinction and inattention: (0)   No abnormality        Total Score:  0     Stroke Mimics were considered (including migraine headache, seizure disorder, hypoglycemia (or hyperglycemia), head or spinal trauma, CNS infection, Toxin ingestion and shock state (e.g. sepsis) .    Scribe Disclosure:  I, Rody Silvestre, am serving as a scribe at 5:15 PM on 8/27/2019 to document services personally performed by Felipe Montalvo MD based on my observations and the provider's statements to me.          EMERGENCY DEPARTMENT       Felipe Montalvo MD  08/28/19 [id number]

## 2019-08-27 NOTE — ED NOTES
Bed: [facility]  Expected date:   Expected time:   Means of arrival:   Comments:  [facility] 2-74M Stroke alert

## 2019-08-27 NOTE — ED NOTES
"[facility]  ED Nurse Handoff Report    ED Chief complaint: Slurred Speech      ED Diagnosis:   Final diagnoses:   None       Code Status: Full Code    Allergies: No Known Allergies    Activity level - Baseline/Home:  Independent  Activity Level - Current:   Stand with Assist    Patient's Preferred language: English   Needed?: No    Isolation: No  Infection: Not Applicable  Bariatric?: No    Vital Signs:   Vitals:    08/27/19 1718 08/27/19 1740   BP: (!) 160/85 137/74   Pulse: 64 71   Resp: 20 12   Temp: 98.6  F (37  C)    TempSrc: Oral    SpO2: 98% 96%   Weight: 72.1 kg (159 lb)    Height: 1.727 m (5' 8\")        Cardiac Rhythm: ,        Pain level:      Is this patient confused?: No   Does this patient have a guardian?  No         If yes, is there guardianship documents in the Epic \"Code/ACP\" activity?  N/A         Guardian Notified?  N/A  [place] - Suicide Severity Rating Scale Completed?  Yes  If yes, what color did the patient score?  White    Patient Report: Initial Complaint: A/O x4.  Denies pain.  Today went to clinic after noticing some slightly slurred speech since 11am today.  At clinic they noted slurred speech and difficulty writing signature.  Came by ambulance.  Symptoms resolved en route.  18G IV on right lower arm by EMS and 18G on left AC by RN.  NS bolus started, EKG completed, labs sent, and CT completed.  Neuro deficits: pronator drift and slight weakness on right upper extremity.  Wife here.  CT WNL per neuro.  Possible TIA this am or yesterday.  Will admit to 73.  Focused Assessment: see above  Tests Performed:   Results for orders placed or performed during the hospital encounter of 08/27/19   CT Head w/o Contrast    Narrative    CT SCAN OF THE HEAD WITHOUT CONTRAST   8/27/2019 5:28 PM     HISTORY: Code Stroke    TECHNIQUE: Axial images of the head and coronal reformations without  IV contrast material. Radiation dose for this scan was reduced using  automated " exposure control, adjustment of the mA and/or kV according  to patient size, or iterative reconstruction technique.    COMPARISON: None.    FINDINGS: The cerebral hemispheres, brainstem, and cerebellum  demonstrate normal morphology and attenuation. No evidence of acute  ischemia, hemorrhage, mass, mass effect, or hydrocephalus. The  visualized calvarium, tympanic cavities, mastoid cavities, and  paranasal sinuses are unremarkable.      Impression    IMPRESSION: No evidence of acute ischemia or hemorrhage.    Findings were discussed by phone between Dr. Washington and Dr. Montalvo at  5:30 PM on 8/27/2019.    PATI WASHINGTON MD   CT Head Perfusion w Contrast    Narrative    CT BRAIN PERFUSION 8/27/2019 5:45 PM    HISTORY: Code Stroke    TECHNIQUE: Time sequential axial CT images of the head were acquired  during the administration of 50 mL Isovue-370 IV. Color perfusion maps  of the brain were created from this time sequential axial source data.      Radiation dose for this scan was reduced using automated exposure  control, adjustment of the mA and/or kV according to patient size, or  iterative reconstruction technique.    COMPARISON: None.    FINDINGS: Blood volumes, transit times, and flow maps demonstrate  symmetric perfusion bilaterally without evidence of focal perfusion  defect.      Impression    IMPRESSION: Unremarkable.    PATI WASHINGTON MD   CTA Head Neck with Contrast    Narrative    CT ANGIOGRAM OF THE HEAD AND NECK WITH CONTRAST  8/27/2019 5:31 PM     HISTORY: Code Stroke     TECHNIQUE: CT angiography with an injection of 70 mL Isovue-370 IV  with scans through the head and neck. Images were transferred to a  separate 3-D workstation where multiplanar reformations and 3-D images  were created. Estimates of carotid stenoses are made relative to the  distal internal carotid artery diameters except as noted. Radiation  dose for this scan was reduced using automated exposure control,  adjustment of the mA  and/or kV according to patient size, or iterative  reconstruction technique.    COMPARISON: None.     CT ANGIOGRAM HEAD FINDINGS: The intracranial vertebral arteries,  basilar artery, and posterior cerebral arteries are patent. The  internal carotid arteries, anterior cerebral arteries, and middle  cerebral arteries are patent. No evidence of large vessel occlusion.  Scattered moderate plaquing is present. No aneurysms are identified.     CT ANGIOGRAM NECK FINDINGS: A three-vessel aortic arch is present. The  bilateral common carotid, internal carotid, external carotid, and  vertebral arteries are patent. No evidence of dissection, occlusion,  or flow-limiting stenosis. High attenuation along the left vertebral  artery at the V2-3 junction is likely artifactual related to streak  artifact from dental amalgam.     Heterogeneous thyroid gland enhancement is present with subcentimeter  nodules for which no dedicated imaging follow-up is required. Moderate  cervical spine degenerative change is present.      Impression    IMPRESSION:   CTA Head: Unremarkable.  CTA Neck: Unremarkable.   Basic metabolic panel   Result Value Ref Range    Sodium 139 133 - 144 mmol/L    Potassium 4.2 3.4 - 5.3 mmol/L    Chloride 105 94 - 109 mmol/L    Carbon Dioxide 29 20 - 32 mmol/L    Anion Gap 5 3 - 14 mmol/L    Glucose 99 70 - 99 mg/dL    Urea Nitrogen 13 7 - 30 mg/dL    Creatinine 1.05 0.66 - 1.25 mg/dL    GFR Estimate 70 >60 mL/min/[1.73_m2]    GFR Estimate If Black 81 >60 mL/min/[1.73_m2]    Calcium 9.2 8.5 - 10.1 mg/dL   CBC with platelets differential   Result Value Ref Range    WBC 6.8 4.0 - 11.0 10e9/L    RBC Count 4.98 4.4 - 5.9 10e12/L    Hemoglobin 15.0 13.3 - 17.7 g/dL    Hematocrit 44.2 40.0 - 53.0 %    MCV 89 78 - 100 fl    MCH 30.1 26.5 - 33.0 pg    MCHC 33.9 31.5 - 36.5 g/dL    RDW 13.5 10.0 - 15.0 %    Platelet Count 236 150 - 450 10e9/L    Diff Method Automated Method     % Neutrophils 73.4 %    % Lymphocytes 19.3 %    %  Monocytes 5.3 %    % Eosinophils 1.6 %    % Basophils 0.3 %    % Immature Granulocytes 0.1 %    Nucleated RBCs 0 0 /100    Absolute Neutrophil 5.0 1.6 - 8.3 10e9/L    Absolute Lymphocytes 1.3 0.8 - 5.3 10e9/L    Absolute Monocytes 0.4 0.0 - 1.3 10e9/L    Absolute Eosinophils 0.1 0.0 - 0.7 10e9/L    Absolute Basophils 0.0 0.0 - 0.2 10e9/L    Abs Immature Granulocytes 0.0 0 - 0.4 10e9/L    Absolute Nucleated RBC 0.0    INR   Result Value Ref Range    INR 0.98 0.86 - 1.14   Partial thromboplastin time   Result Value Ref Range    PTT 29 22 - 37 sec   Troponin I   Result Value Ref Range    Troponin I ES <0.015 0.000 - 0.045 ug/L       Abnormal Results: see above    Treatments provided: see above      Family Comments: none    OBS brochure/video discussed/provided to patient/family: No              Name of person given brochure if not patient: na              Relationship to patient: na    ED Medications:   Medications   Saline Flush - CT (100 mLs Intravenous Given 8/27/19 1731)   iopamidol (ISOVUE-370) solution 120 mL (120 mLs Intravenous Given 8/27/19 1731)       Drips infusing?:  No    For the majority of the shift this patient was Green.   Interventions performed were encouragement.    Severe Sepsis OR Septic Shock Diagnosis Present: No    To be done/followed up on inpatient unit:  MRI (checklist is complete)    ED NURSE PHONE NUMBER: ED

## 2019-08-28 ENCOUNTER — APPOINTMENT (OUTPATIENT)
Dept: OCCUPATIONAL THERAPY | Facility: CLINIC | Age: 74
DRG: 065 | End: 2019-08-28
Attending: STUDENT IN AN ORGANIZED HEALTH CARE EDUCATION/TRAINING PROGRAM
Payer: COMMERCIAL

## 2019-08-28 ENCOUNTER — APPOINTMENT (OUTPATIENT)
Dept: SPEECH THERAPY | Facility: CLINIC | Age: 74
DRG: 065 | End: 2019-08-28
Attending: STUDENT IN AN ORGANIZED HEALTH CARE EDUCATION/TRAINING PROGRAM
Payer: COMMERCIAL

## 2019-08-28 ENCOUNTER — APPOINTMENT (OUTPATIENT)
Dept: CARDIOLOGY | Facility: CLINIC | Age: 74
DRG: 065 | End: 2019-08-28
Attending: STUDENT IN AN ORGANIZED HEALTH CARE EDUCATION/TRAINING PROGRAM
Payer: COMMERCIAL

## 2019-08-28 ENCOUNTER — APPOINTMENT (OUTPATIENT)
Dept: PHYSICAL THERAPY | Facility: CLINIC | Age: 74
DRG: 065 | End: 2019-08-28
Attending: STUDENT IN AN ORGANIZED HEALTH CARE EDUCATION/TRAINING PROGRAM
Payer: COMMERCIAL

## 2019-08-28 VITALS
TEMPERATURE: 96.9 F | BODY MASS INDEX: 24.1 KG/M2 | SYSTOLIC BLOOD PRESSURE: 125 MMHG | DIASTOLIC BLOOD PRESSURE: 70 MMHG | OXYGEN SATURATION: 95 % | WEIGHT: 159 LBS | HEART RATE: 61 BPM | HEIGHT: 68 IN | RESPIRATION RATE: 16 BRPM

## 2019-08-28 LAB
ALBUMIN SERPL-MCNC: 3.3 G/DL (ref 3.4–5)
ALP SERPL-CCNC: 64 U/L (ref 40–150)
ALT SERPL W P-5'-P-CCNC: 20 U/L (ref 0–70)
ANION GAP SERPL CALCULATED.3IONS-SCNC: 2 MMOL/L (ref 3–14)
AST SERPL W P-5'-P-CCNC: 15 U/L (ref 0–45)
BILIRUB DIRECT SERPL-MCNC: <0.1 MG/DL (ref 0–0.2)
BILIRUB SERPL-MCNC: 0.4 MG/DL (ref 0.2–1.3)
BUN SERPL-MCNC: 13 MG/DL (ref 7–30)
CALCIUM SERPL-MCNC: 8.3 MG/DL (ref 8.5–10.1)
CHLORIDE SERPL-SCNC: 109 MMOL/L (ref 94–109)
CHOLEST SERPL-MCNC: 219 MG/DL
CO2 SERPL-SCNC: 30 MMOL/L (ref 20–32)
CREAT SERPL-MCNC: 1.08 MG/DL (ref 0.66–1.25)
ERYTHROCYTE [DISTWIDTH] IN BLOOD BY AUTOMATED COUNT: 13.5 % (ref 10–15)
GFR SERPL CREATININE-BSD FRML MDRD: 67 ML/MIN/{1.73_M2}
GLUCOSE BLDC GLUCOMTR-MCNC: 114 MG/DL (ref 70–99)
GLUCOSE BLDC GLUCOMTR-MCNC: 90 MG/DL (ref 70–99)
GLUCOSE SERPL-MCNC: 89 MG/DL (ref 70–99)
HBA1C MFR BLD: 5.6 % (ref 0–5.6)
HCT VFR BLD AUTO: 40 % (ref 40–53)
HDLC SERPL-MCNC: 51 MG/DL
HGB BLD-MCNC: 13.4 G/DL (ref 13.3–17.7)
INTERPRETATION ECG - MUSE: NORMAL
LDLC SERPL CALC-MCNC: 147 MG/DL
MCH RBC QN AUTO: 29.5 PG (ref 26.5–33)
MCHC RBC AUTO-ENTMCNC: 33.5 G/DL (ref 31.5–36.5)
MCV RBC AUTO: 88 FL (ref 78–100)
NONHDLC SERPL-MCNC: 168 MG/DL
PLATELET # BLD AUTO: 207 10E9/L (ref 150–450)
POTASSIUM SERPL-SCNC: 3.8 MMOL/L (ref 3.4–5.3)
PROT SERPL-MCNC: 6.6 G/DL (ref 6.8–8.8)
RBC # BLD AUTO: 4.54 10E12/L (ref 4.4–5.9)
SODIUM SERPL-SCNC: 141 MMOL/L (ref 133–144)
TRIGL SERPL-MCNC: 103 MG/DL
WBC # BLD AUTO: 6.7 10E9/L (ref 4–11)

## 2019-08-28 PROCEDURE — 97165 OT EVAL LOW COMPLEX 30 MIN: CPT | Mod: GO | Performed by: OCCUPATIONAL THERAPIST

## 2019-08-28 PROCEDURE — 99239 HOSP IP/OBS DSCHRG MGMT >30: CPT | Performed by: HOSPITALIST

## 2019-08-28 PROCEDURE — 80076 HEPATIC FUNCTION PANEL: CPT | Performed by: STUDENT IN AN ORGANIZED HEALTH CARE EDUCATION/TRAINING PROGRAM

## 2019-08-28 PROCEDURE — 97161 PT EVAL LOW COMPLEX 20 MIN: CPT | Mod: GP | Performed by: PHYSICAL THERAPIST

## 2019-08-28 PROCEDURE — 80048 BASIC METABOLIC PNL TOTAL CA: CPT | Performed by: STUDENT IN AN ORGANIZED HEALTH CARE EDUCATION/TRAINING PROGRAM

## 2019-08-28 PROCEDURE — 40000264 ECHOCARDIOGRAM COMPLETE

## 2019-08-28 PROCEDURE — 97112 NEUROMUSCULAR REEDUCATION: CPT | Mod: GO | Performed by: OCCUPATIONAL THERAPIST

## 2019-08-28 PROCEDURE — 80061 LIPID PANEL: CPT | Performed by: STUDENT IN AN ORGANIZED HEALTH CARE EDUCATION/TRAINING PROGRAM

## 2019-08-28 PROCEDURE — 25500064 ZZH RX 255 OP 636: Performed by: STUDENT IN AN ORGANIZED HEALTH CARE EDUCATION/TRAINING PROGRAM

## 2019-08-28 PROCEDURE — 83036 HEMOGLOBIN GLYCOSYLATED A1C: CPT | Performed by: STUDENT IN AN ORGANIZED HEALTH CARE EDUCATION/TRAINING PROGRAM

## 2019-08-28 PROCEDURE — 25000132 ZZH RX MED GY IP 250 OP 250 PS 637: Performed by: STUDENT IN AN ORGANIZED HEALTH CARE EDUCATION/TRAINING PROGRAM

## 2019-08-28 PROCEDURE — 97116 GAIT TRAINING THERAPY: CPT | Mod: GP | Performed by: PHYSICAL THERAPIST

## 2019-08-28 PROCEDURE — 36415 COLL VENOUS BLD VENIPUNCTURE: CPT | Performed by: STUDENT IN AN ORGANIZED HEALTH CARE EDUCATION/TRAINING PROGRAM

## 2019-08-28 PROCEDURE — 93306 TTE W/DOPPLER COMPLETE: CPT | Mod: 26 | Performed by: INTERNAL MEDICINE

## 2019-08-28 PROCEDURE — 00000146 ZZHCL STATISTIC GLUCOSE BY METER IP

## 2019-08-28 PROCEDURE — 99232 SBSQ HOSP IP/OBS MODERATE 35: CPT | Performed by: PSYCHIATRY & NEUROLOGY

## 2019-08-28 PROCEDURE — 85027 COMPLETE CBC AUTOMATED: CPT | Performed by: STUDENT IN AN ORGANIZED HEALTH CARE EDUCATION/TRAINING PROGRAM

## 2019-08-28 PROCEDURE — 92610 EVALUATE SWALLOWING FUNCTION: CPT | Mod: GN | Performed by: SPEECH-LANGUAGE PATHOLOGIST

## 2019-08-28 RX ORDER — ATORVASTATIN CALCIUM 40 MG/1
40 TABLET, FILM COATED ORAL DAILY
Qty: 30 TABLET | Refills: 0 | Status: SHIPPED | OUTPATIENT
Start: 2019-08-28

## 2019-08-28 RX ORDER — CLOPIDOGREL BISULFATE 75 MG/1
75 TABLET ORAL DAILY
Qty: 21 TABLET | Refills: 0 | Status: SHIPPED | OUTPATIENT
Start: 2019-08-29

## 2019-08-28 RX ADMIN — HUMAN ALBUMIN MICROSPHERES AND PERFLUTREN 5 ML: 10; .22 INJECTION, SOLUTION INTRAVENOUS at 12:30

## 2019-08-28 RX ADMIN — ASPIRIN 81 MG: 81 TABLET, COATED ORAL at 09:39

## 2019-08-28 RX ADMIN — CLOPIDOGREL BISULFATE 75 MG: 75 TABLET ORAL at 09:39

## 2019-08-28 ASSESSMENT — ACTIVITIES OF DAILY LIVING (ADL)
ADLS_ACUITY_SCORE: 8
PREVIOUS_RESPONSIBILITIES: MEDICATION MANAGEMENT;FINANCES;DRIVING;SHOPPING
ADLS_ACUITY_SCORE: 8
ADLS_ACUITY_SCORE: 8
ADLS_ACUITY_SCORE: 10
ADLS_ACUITY_SCORE: 8

## 2019-08-28 ASSESSMENT — MIFFLIN-ST. JEOR: SCORE: 1435.72

## 2019-08-28 NOTE — PLAN OF CARE
Discharge Planner SLP   Patient plan for discharge: Home  Current status: Bedside swallow evaluation completed. Patient presents with minimal oral and pharyngeal dysphagia at bedside. Minimal dysarthria noted in conversation characterized by imprecise articulation and some word finding deficits noted. Patient reports that his speech is still off. He was able to tolerate thin liquids via the cup and straw without overt Sx of aspiration. Mastication was mildly prolonged for a solid with minimal oral residue in the right lateral sulci, that he independently cleared. No Sx of of aspiration, no reported globus sensation or vocal changes noted. No skilled intervention is indicated for swallowing. Recommend: 1. May continue on a regular diet and thin liquids. 2. Upright, avoid straws, small bites/sips, alternate liquids/solids as needed.   Barriers to return to prior living situation: None per speech perspective.   Recommendations for discharge: OP ST  Rationale for recommendations: Defer speech/language evaluation to OP ST for dysarthria and assess language.     Speech Language Therapy Discharge Summary    Reason for therapy discharge:    Discharged to home with outpatient therapy.    Progress towards therapy goal(s). See goals on Care Plan in [facility] electronic health record for goal details.  Goals partially met.  Barriers to achieving goals:   discharge from facility.    Therapy recommendation(s):    Continued therapy is recommended.  Rationale/Recommendations:  Complete an OP speech and language evaluation. .           Entered by: Patricia Philip 08/28/2019 10:33 AM

## 2019-08-28 NOTE — PLAN OF CARE
Discharge Planner PT   Patient plan for discharge: Home  Current status: Pt is a 74 year old male admitted with slurred speech and symptoms of TIA. Diagnosed with occlusion of small vessel which led to TIA and R sided weakness. Pt lives in house with his wife and is independent in all mobility at baseline. Pt supine<>sit IND. Sit<>stand IND. Ambulated 250' with SBA and performed dynamic head turns and changes in gait speed with minimal to no path deviation. Navigated 16 stairs with R side railing, SBA, and reciprocal gait. Pt scored 51/56 on Dvais balance test, see chart for details.   Barriers to return to prior living situation: Impaired high level balance, decreased activity tolerance, R sided weakness, fall risk.  Recommendations for discharge: Home with assist from spouse for functional mobility and OP PT for high level balance.  Rationale for recommendations: Pt would benefit from OP PT to address high level balance as indicated by davis balance score as well as the above limitations. Spouse states she will be able to help with functional mobility.       Entered by: Reagan Velarde 08/28/2019 3:51 PM     Physical Therapy Discharge Summary    Reason for therapy discharge:    Discharged to home with outpatient therapy.    Progress towards therapy goal(s). See goals on Care Plan in [facility] electronic health record for goal details.  Goals met    Therapy recommendation(s):    Continued therapy is recommended.  Rationale/Recommendations:  OP PT recommended to help pt increase ability to perform high level balance activities..

## 2019-08-28 NOTE — PROGRESS NOTES
[facility]    Stroke Progress Note    Interval Events  Patient presented with right sided weakness that started the day prior to admission. He [place] up [place] his cabin on 8[place]26 with difficulty moving his right side. He noted that when he rested it seemed to be better, so he rested a lot that day. The next morning he awoke and the weakness was still present, [place] for evaluation. CT/CTA/CTP negative for acute abnormality. MRI with small left pontine abnormality - radiology thought possibly artifact, but it appears to be a true defect given patient's symptoms.   Today he still feels slightly weak on the right hand, but notes no difficulty holding glasses or utensils to eat or drink. He is right handed. On exam he has mild discoordination of the right hand, but no other focal neurological deficits. He denies headache, dizziness or lightheadedness, numbness today. He notes he had some tingling in the fingers of his right hand yesterday that has resolved. He does have mild dysarthria.    Impression  Ischemic Stroke due to small-vessel occlusion     Recommendations  - continue atorvastatin 40 mg daily at this time  - if develops muscle aches, could consider adding CoQ10 100 mg daily, which may resolve mild muscle aches  - continue aspirin 81 mg and plavix 75 mg daily for 3 weeks, then aspirin alone daily  - discussed dietary changes including eating more vegetables and less processed foods (breads, etc) to help decrease cholesterol and subsequent stroke risk    Patient Follow-Up  - 4-6 weeks with outpatient neurology    Thank you for this consult.  No further stroke evaluation is recommended, so we will sign off. Please contact us with any additional questions.    Sheri Ro, MSN, FNP-BC, RN CNRN SCRN  Neurology    Text Page [phone])  ______________________________________________________    Medications   Home Meds  Prior to Admission medications    Medication Sig  Start Date End Date Taking? Authorizing Provider   aspirin (ASA) 325 MG EC tablet Take 650 mg by mouth as needed for moderate pain   Yes Unknown, Entered By History   ibuprofen (ADVIL/MOTRIN) 200 MG tablet Take 400 mg by mouth as needed for moderate pain    Yes Reported, Patient       Scheduled Meds    aspirin  81 mg Oral Daily     atorvastatin  40 mg Oral or NG Tube Daily at 8 pm     clopidogrel  75 mg Oral or NG Tube Daily     sodium chloride (PF)  3 mL Intracatheter Q8H       Infusion Meds    - MEDICATION INSTRUCTIONS -       - MEDICATION INSTRUCTIONS -         PRN Meds  acetaminophen, labetalol, lidocaine 4%, lidocaine (buffered or not buffered), - MEDICATION INSTRUCTIONS -, melatonin, naloxone, ondansetron **OR** ondansetron, - MEDICATION INSTRUCTIONS -, sodium chloride (PF)       PHYSICAL EXAMINATION  Temp:  [96.7  F (35.9  C)-98.6  F (37  C)] 97.2  F (36.2  C)  Pulse:  [57-75] 61  Heart Rate:  [56-73] 67  Resp:  [11-20] 16  BP: (111-160)/(66-93) 117/70  SpO2:  [95 %-99 %] 98 %     Neurologic  Mental Status:  alert, oriented x 3, follows commands, speech clear and fluent, naming and repetition normal  Cranial Nerves:  visual fields intact, PERRL, EOMI with normal smooth pursuit, facial sensation intact and symmetric, facial movements symmetric, hearing not formally tested but intact to conversation, palate elevation symmetric and uvula midline, shoulder shrug strong bilaterally, tongue protrusion midline, mild dysarthria  Motor:  normal muscle tone and bulk, no abnormal movements, able to move all limbs spontaneously, strength 5/5 throughout upper and lower extremities, no pronator drift  Reflexes:  toes down-going  Sensory:  light touch sensation intact and symmetric throughout upper and lower extremities, no extinction on double simultaneous stimulation   Coordination:  rapid alternating movements symmetric, mild discoordination right finger to nose on the right  Station/Gait:  normal width, turn, arm swing      Stroke Scales  National [employer] Stroke Scale  Exam Interval: daily   Score    Level of consciousness: (0)   Alert, keenly responsive    LOC questions: (0)   Answers both questions correctly    LOC commands: (0)   Performs both tasks correctly    Best gaze: (0)   Normal    Visual: (0)   No visual loss    Facial palsy: (0)   Normal symmetrical movements    Motor arm (left): (0)   No drift    Motor arm (right): (0)   No drift    Motor leg (left): (0)   No drift    Motor leg (right): (0)   No drift    Limb ataxia: (1)   Present in one limb    Sensory: (0)   Normal- no sensory loss    Best language: (0)   Normal- no aphasia    Dysarthria: (1)   Mild to moderate dysarthria    Extinction and inattention: (0)   No abnormality        Total Score:  2         Imaging  I personally reviewed all imaging; relevant findings per HPI.     CT head 8/27/19:  No evidence of acute ischemia or hemorrhage.    CTA head/neck 8/27/19:  CTA Head: Unremarkable.  CTA Neck: Unremarkable.    CTP head 8/27/19:  Unremarkable.    MRI brain 8/27/19:  Asymmetric high signal within the left cami on diffusion-weighted imaging. This is favored to be artifactual related to adjacent pneumatization of the clivus. Otherwise unremarkable MRI of the head with and without contrast.    Lab Results Data   CBC  Recent Labs   Lab 08/28/19  0720 08/27/19  1720   WBC 6.7 6.8   RBC 4.54 4.98   HGB 13.4 15.0   HCT 40.0 44.2    236     Basic Metabolic Panel    Recent Labs   Lab 08/28/19  0720 08/27/19  1720    139   POTASSIUM 3.8 4.2   CHLORIDE 109 105   CO2 30 29   BUN 13 13   CR 1.08 1.05   GLC 89 99   KIANNA 8.3* 9.2     Liver Panel  Recent Labs   Lab Test 08/28/19  0720   PROTTOTAL 6.6*   ALBUMIN 3.3*   BILITOTAL 0.4   ALKPHOS 64   AST 15   ALT 20     INR  Recent Labs   Lab Test 08/27/19  1720   INR 0.98      Lipid Profile  Recent Labs   Lab Test 08/28/19  0720   CHOL 219*   HDL 51   *   TRIG 103     A1C  Recent Labs   Lab Test  08/28/19  0720   A1C 5.6     Troponin I  Recent Labs   Lab 08/27/19  1720   TROPI <0.015

## 2019-08-28 NOTE — PROGRESS NOTES
Discharge to home. Will follow up with PCP in 1 week as instructed. Discharge education/medications complete. Transport arranged with wife.

## 2019-08-28 NOTE — PLAN OF CARE
RECEIVING UNIT ED HANDOFF REVIEW    ED Nurse Handoff Report was reviewed by: Michael Boyce RN on August 27, 2019 at 8:21 PM

## 2019-08-28 NOTE — DISCHARGE INSTRUCTIONS
CRUZ REVIEW THE STROKE HANDBOOK HANDED TO YOU BY YOUR NURSE!!!!!  Your risk factors for stroke or TIA (transient ischemic attack):    Your Risk Factors Your Results Normal Ranges   High blood pressure BP Readings from Last 1 Encounters:   08/28/19 125/70    Less than 120/80   Cholesterol              Total Lab Results   Component Value Date    CHOL 219 08/28/2019      Less than 150    Triglycerides   Lab Results   Component Value Date    TRIG 103 08/28/2019    Less than 150   LDL Lab Results   Component Value Date     08/28/2019       Less than 70   HDL Lab Results   Component Value Date    HDL 51 08/28/2019            Greater than 40 (men)  Greater than 50 (women)   Diabetes Recent Labs   Lab 08/28/19  0720   GLC 89    Fasting blood glucose    Smoking/tobacco use  Quit smoking and tobacco   Overweight  Lose 1-2 pounds a week   Lack of exercise  30 minutes moderate activity each day   Other risk factors include carotid (neck) artery disease, atrial fibrillation and stress. You may be on new medicine to treat high blood pressure, cholesterol, diabetes or atrial fibrillation.    Understanding Stroke Booklet given to patient. Please refer to booklet for further information.    Stroke warning signs and symptoms - CALL 911 right away for:  - Sudden numbness or weakness in the face, arm or leg (often on one side of the body).  - Sudden confusion or trouble understanding what is going on.  - Sudden blurred or decreased vision in one or both eyes.  - Sudden trouble speaking, loss of balance, dizziness or problems with coordination.  - Sudden, severe headache for no reason.  - Fainting or seizures.  - Symptoms may go away then come back suddenly.  Please follow up with neurology in *** weeks. You may call any of the following neurology clinics for an appointment or a clinic of your choice. Tell them you were recently hospitalized and need follow up as recommended at discharge.    1. [facility]  [facility]   [address] [place] [address], [address]   [place] [address]   [phone]  2. [facility] Neurology   [address] [facility], [address]   [place] [address]   [phone]  3. [facility] - Jolene Acevedo MD   [address] [place] [address]   [phone]  4. [facility] - Sha Acevedo MD   [address] [place] [address]   [phone]      Neurology also recommends follow-up {IMAGIN} be completed in 4 to 6 weeks.

## 2019-08-28 NOTE — PROGRESS NOTES
08/28/19 1326   Quick Adds   Type of Visit Initial Occupational Therapy Evaluation   Living Environment   Lives With spouse   Living Arrangements house   Home Accessibility stairs to enter home;stairs within home   Number of Stairs, Main Entrance 3   Stair Railings, Main Entrance railings safe and in good condition   Number of Stairs, Within Home, Primary other (see comments)  (13 upstairs, 11 downstairs )   Stair Railings, Within Home, Primary railings safe and in good condition   Transportation Anticipated family or friend will provide   Self-Care   Equipment Currently Used at Home grab bar, tub/shower  (handle built into shower)   Functional Level   Ambulation 0-->independent   Transferring 0-->independent   Toileting 0-->independent   Bathing 1-->assistive equipment   Dressing 0-->independent   Fall history within last six months no   Prior Functional Level Comment Previously IND with ADLs and IADLs   General Information   Onset of Illness/Injury or Date of Surgery - Date 08/27/19   Referring Physician MD Kaylen   Patient/Family Goals Statement wants to go home   Additional Occupational Profile Info/Pertinent History of Current Problem 75 yo male who presented with R sided weakness. Pt was a concern for CVA however, CT was negative for abnormalitly. MRI was coducted finding small L pontine abnormality. D/t consistent incoordination and slight weakness it was determined that the pt had a small ischemic stroke d/t small vessel occulsion.    Precautions/Limitations fall precautions   Cognitive Status Examination   Orientation orientation to person, place and time   Level of Consciousness alert   Follows Commands (Cognition) follows multi-step commands   Memory intact   Cognitive Comment follows commands 100% of the time    Visual Perception   Visual Perception Wears glasses   Visual Field No deficitis identified    Occulomotor No deficitis identified    Visual Perception Comments pt reports not needing glasses  in order to drive.    Sensory Examination   Sensory Quick Adds No deficits were identified   Pain Assessment   Patient Currently in Pain No   Range of Motion (ROM)   ROM Comment BUE WNL   Strength   Strength Comments BUE WNL   Hand Strength   Hand Strength Comments BUE WNL   Coordination   Upper Extremity Coordination Right UE impaired  (slight impairement )   Mobility   Bed Mobility Bed mobility skill: Supine to sit;Bed mobility skill: Sit to supine   Bed Mobility Skill: Sit to Supine   Level of [place]: Sit/Supine independent   Bed Mobility Skill: Supine to Sit   Level of [place]: Supine/Sit independent   Transfer Skill: Sit to Stand   Level of [place]: Sit/Stand independent   Toilet Transfer   Toilet Transfer Toilet Transfer Skill   Transfer Skill: Toilet Transfer   Level of [place]: Toilet independent   Balance   Balance Comments IND while ambulating in room. SBA while completing PT screen   Lower Body Dressing   Level of [place]: Dress Lower Body independent  (pants bulmaro/doff with retrival )   Grooming   Level of [place]: Grooming independent  (standing at the sink)   Instrumental Activities of Daily Living (IADL)   Previous Responsibilities medication management;finances;driving;shopping   Activities of Daily Living Analysis   Impairments Contributing to Impaired Activities of Daily Living coordination impaired   General Therapy Interventions   Planned Therapy Interventions motor coordination training   Clinical Impression   Criteria for Skilled Therapeutic Interventions Met yes, treatment indicated   OT Diagnosis decrease coordination in R side   Influenced by the following impairments decreased coordination in R hand   Assessment of Occupational Performance 1-3 Performance Deficits   Identified Performance Deficits Handwriting   Clinical Decision Making (Complexity) Low complexity   Therapy Frequency Other (see comments)   Predicted Duration of Therapy Intervention (days/wks) 1x  "eval/treatment sessions   Anticipated Discharge Disposition Home   Risks and Benefits of Treatment have been explained. Yes   Patient, Family & other staff in agreement with plan of care Yes   [facility] AM-PAC  \"6 Clicks\" Daily Activity Inpatient Short Form   1. Putting on and taking off regular lower body clothing? 4 - None   2. Bathing (including washing, rinsing, drying)? 4 - None   3. Toileting, which includes using toilet, bedpan or urinal? 4 - None   4. Putting on and taking off regular upper body clothing? 4 - None   5. Taking care of personal grooming such as brushing teeth? 4 - None   6. Eating meals? 4 - None   Daily Activity Raw Score (Score out of 24.Lower scores equate to lower levels of function) 24   Total Evaluation Time   Total Evaluation Time (Minutes) 20     "

## 2019-08-28 NOTE — PLAN OF CARE
A&Ox4. VSS on RA, HR ethel. Tele: Sinus ethel. Up SBA. Regular diet. BS q6hrs, 114 & 90. Neuros and CMS intact. Denies pain, headache/nausea. IV-SL. Plan for neurology consult today. Continue to monitor.

## 2019-08-28 NOTE — PLAN OF CARE
Patient arrived on station 88 at 2100. A+OX4, VSS on RA. Denies pain. Neuros currently intact. Tele NSR. Plan for neuro consult in the morning.

## 2019-08-28 NOTE — DISCHARGE SUMMARY
[facility]  Hospitalist Discharge Summary       Date of Admission:  8/27/2019  Date of Discharge:  8/28/2019  Discharging Provider: Wyatt Hooker,       Discharge Diagnoses   Left pontine thrombotic CVA    Follow-ups Needed After Discharge   Follow-up with [place] [facility] of neurology in 4-6 weeks  Follow up with PCP office in 7 week    Unresulted Labs Ordered in the Past 30 Days of this Admission     No orders found for last 31 day(s).      These results will be followed up by none    Discharge Disposition   Discharged to home  Condition at discharge: Stable    Hospital Course     Cerebrovascular accident (H) vs TIA?    Assessment: Presents with 1 day history of right-sided upper and lower extremity weakness without sensory deficits.  Initial imaging including CT head perfusion, CTA head and neck, and CT head without contrast showed no focal perfusion deficit, and CT angiogram was unremarkable overall.  Stroke code was called in the emergency department, patient was evaluated by stroke neuro who suspects that patient may have had a likely left IC/subcortical or pontine small ischemic stroke.  Overall patient has returned to his baseline neuro status, there is no focal deficits on his neuro exam.  Was Plavix loaded in the emergency department/was given aspirin 324 mg.  MRI revealed left cami lesion, CVA versus artifact  Given the clinical findings of right hand weakness neuro impression was CVA  The patient was seen by therapy and his deficits were mostly in terms of right hand weakness and some mild balance issues.  Outpatient therapies were recommended  An echocardiogram was normal  CTA of the head and neck were normal  We discussed risk factor adjustments, diet and exercise  Neurology signed off  The patient desired home, and so was discharged.    Plan:   - 3 weeks of plavix and aspirin and then plavix daily  - continue atorvastatin  - outpatient PT/OT/SLP  - follow-up with neurologist in 4-6  weeks         Hyperlipidemia    Assessment/Plan:  Lipitor 40 mg started.       Back Pain    Assessment/Plan: chronic in nature. Controlled with tylenol as needed    Consultations This Hospital Stay   SWALLOW EVAL SPEECH PATH AT BEDSIDE IP CONSULT  SMOKING CESSATION PROGRAM IP CONSULT  PATIENT LEARNING CENTER IP CONSULT  SWALLOW EVAL SPEECH PATH AT BEDSIDE IP CONSULT  PHYSICAL THERAPY ADULT IP CONSULT  OCCUPATIONAL THERAPY ADULT IP CONSULT  NEUROLOGY IP CONSULT    Code Status   Full Code    Time Spent on this Encounter   I, Wyatt Hooker DO, personally saw the patient today and spent greater than 30 minutes discharging this patient.       Wyatt Hooker DO  [facility]  ______________________________________________________________________    Physical Exam   Vital Signs: Temp: 97.2  F (36.2  C) Temp src: (P) Oral BP: 117/70 Pulse: 61 Heart Rate: 67 Resp: 16 SpO2: 98 % O2 Device: None (Room air)    Weight: 159 lbs 0 oz  Constitutional: awake, alert, cooperative, no apparent distress.   Eyes: Lids and lashes normal, pupils equal, round and reactive to light   Hematologic / Lymphatic: no cervical lymphadenopathy   Respiratory: CTABL   Cardiovascular: RRR with no m/r/g   GI: Normal bowel sounds, soft, non-distended, non-tender.   Skin: normal skin color, texture, turgor   Musculoskeletal: There is no redness, warmth, or swelling of the joints. Full range of motion noted.   Neurologic: Awake, alert, oriented to name, place and time. Cranial nerves II-XII are grossly intact. Motor is 5 out of 5 bilaterally. Sensory is intact. Speech is fluent and coherent. Negative Romberg. Rapid alternating movements Finger to nose are both worse on the right  Neuropsychiatric: normal mood and affect       Primary Care Physician   Kendell Navarrete    Discharge Orders   No discharge procedures on file.    Significant Results and Procedures   Most Recent 3 CBC's:  Recent Labs   Lab Test 08/28/19  0720  08/27/19  1720   WBC 6.7 6.8   HGB 13.4 15.0   MCV 88 89    236     Most Recent 3 BMP's:  Recent Labs   Lab Test 08/28/19  0720 08/27/19  1720    139   POTASSIUM 3.8 4.2   CHLORIDE 109 105   CO2 30 29   BUN 13 13   CR 1.08 1.05   ANIONGAP 2* 5   KIANNA 8.3* 9.2   GLC 89 99     Most Recent 2 LFT's:  Recent Labs   Lab Test 08/28/19  0720   AST 15   ALT 20   ALKPHOS 64   BILITOTAL 0.4     Most Recent 3 INR's:  Recent Labs   Lab Test 08/27/19  1720   INR 0.98   ,   Results for orders placed or performed during the hospital encounter of 08/27/19   CT Head w/o Contrast    Narrative    CT SCAN OF THE HEAD WITHOUT CONTRAST   8/27/2019 5:28 PM     HISTORY: Code Stroke    TECHNIQUE: Axial images of the head and coronal reformations without  IV contrast material. Radiation dose for this scan was reduced using  automated exposure control, adjustment of the mA and/or kV according  to patient size, or iterative reconstruction technique.    COMPARISON: None.    FINDINGS: The cerebral hemispheres, brainstem, and cerebellum  demonstrate normal morphology and attenuation. No evidence of acute  ischemia, hemorrhage, mass, mass effect, or hydrocephalus. The  visualized calvarium, tympanic cavities, mastoid cavities, and  paranasal sinuses are unremarkable.      Impression    IMPRESSION: No evidence of acute ischemia or hemorrhage.    Findings were discussed by phone between Dr. Washington and Dr. Montalvo at  5:30 PM on 8/27/2019.    PATI WASHINGTON MD   CT Head Perfusion w Contrast    Narrative    CT BRAIN PERFUSION 8/27/2019 5:45 PM    HISTORY: Code Stroke    TECHNIQUE: Time sequential axial CT images of the head were acquired  during the administration of 50 mL Isovue-370 IV. Color perfusion maps  of the brain were created from this time sequential axial source data.      Radiation dose for this scan was reduced using automated exposure  control, adjustment of the mA and/or kV according to patient size, or  iterative  reconstruction technique.    COMPARISON: None.    FINDINGS: Blood volumes, transit times, and flow maps demonstrate  symmetric perfusion bilaterally without evidence of focal perfusion  defect.      Impression    IMPRESSION: Unremarkable.    PATI VU MD   CTA Head Neck with Contrast    Narrative    CT ANGIOGRAM OF THE HEAD AND NECK WITH CONTRAST  8/27/2019 5:31 PM     HISTORY: Code Stroke     TECHNIQUE: CT angiography with an injection of 70 mL Isovue-370 IV  with scans through the head and neck. Images were transferred to a  separate 3-D workstation where multiplanar reformations and 3-D images  were created. Estimates of carotid stenoses are made relative to the  distal internal carotid artery diameters except as noted. Radiation  dose for this scan was reduced using automated exposure control,  adjustment of the mA and/or kV according to patient size, or iterative  reconstruction technique.    COMPARISON: None.     CT ANGIOGRAM HEAD FINDINGS: The intracranial vertebral arteries,  basilar artery, and posterior cerebral arteries are patent. The  internal carotid arteries, anterior cerebral arteries, and middle  cerebral arteries are patent. No evidence of large vessel occlusion.  Scattered moderate plaquing is present. No aneurysms are identified.     CT ANGIOGRAM NECK FINDINGS: A three-vessel aortic arch is present. The  bilateral common carotid, internal carotid, external carotid, and  vertebral arteries are patent. No evidence of dissection, occlusion,  or flow-limiting stenosis. High attenuation along the left vertebral  artery at the V2-3 junction is likely artifactual related to streak  artifact from dental amalgam.     Heterogeneous thyroid gland enhancement is present with subcentimeter  nodules for which no dedicated imaging follow-up is required. Moderate  cervical spine degenerative change is present.      Impression    IMPRESSION:   CTA Head: Unremarkable.  CTA Neck: Unremarkable.    PATI CALDERÓN  MD HORACE   MR Brain w/o & w Contrast    Narrative    MRI BRAIN WITHOUT AND WITH CONTRAST  8/27/2019 8:24 PM    HISTORY:  Right-sided weakness, speech changes.    TECHNIQUE: Multiplanar, multisequence MRI of the brain without and  with 7 mL Gadavist.    COMPARISON: Head CT 8/27/2019    FINDINGS: Asymmetric high signal within the left cami on  diffusion-weighted imaging is present (series 602 image 13).  Otherwise, the cerebral hemispheres, brainstem, and cerebellum  demonstrate normal morphology and signal. Few white matter T2  hyperintensities are present likely reflecting chronic small vessel  ischemic change commensurate with age. No hemorrhage, mass, mass  effect, or hydrocephalus. No abnormal enhancement or diffusion  restriction is identified. The visualized calvarium, tympanic  cavities, mastoid cavities, and paranasal sinuses are unremarkable.      Impression    IMPRESSION: Asymmetric high signal within the left cami on  diffusion-weighted imaging. This is favored to be artifactual related  to adjacent pneumatization of the clivus. Otherwise unremarkable MRI  of the head with and without contrast.    PATI VU MD       Discharge Medications   Current Discharge Medication List      START taking these medications    Details   atorvastatin (LIPITOR) 40 MG tablet 1 tablet (40 mg) by Oral or NG Tube route daily  Qty: 30 tablet, Refills: 0    Associated Diagnoses: Cerebrovascular accident (CVA) due to thrombosis of precerebral artery (H)      clopidogrel (PLAVIX) 75 MG tablet 1 tablet (75 mg) by Oral or NG Tube route daily  Qty: 21 tablet, Refills: 0    Associated Diagnoses: Cerebrovascular accident (CVA) due to thrombosis of precerebral artery (H)         CONTINUE these medications which have CHANGED    Details   aspirin (ASA) 81 MG EC tablet Take 1 tablet (81 mg) by mouth daily  Qty: 30 tablet, Refills: 1    Associated Diagnoses: Cerebrovascular accident (CVA) due to thrombosis of precerebral artery (H)          STOP taking these medications       ibuprofen (ADVIL/MOTRIN) 200 MG tablet Comments:   Reason for Stopping:             Allergies   No Known Allergies

## 2019-08-28 NOTE — PROGRESS NOTES
08/28/19 1500   Quick Adds   Type of Visit Initial PT Evaluation   Living Environment   Lives With spouse   Living Arrangements house   Home Accessibility stairs to enter home;stairs within home   Number of Stairs, Main Entrance 3   Stair Railings, Main Entrance railings safe and in good condition   Number of Stairs, Within Home, Primary other (see comments)  (one flight up and one flight down)   Stair Railings, Within Home, Primary railings safe and in good condition   Transportation Anticipated family or friend will provide   Self-Care   Usual Activity Tolerance good   Current Activity Tolerance moderate   Regular Exercise Yes   Activity/Exercise Type walking;other (see comments)  (downhill and xc ski)   Exercise Amount/Frequency 3-5 times/wk   Equipment Currently Used at Home none   Functional Level Prior   Ambulation 0-->independent   Transferring 0-->independent   Fall history within last six months no   General Information   Onset of Illness/Injury or Date of Surgery - Date 08/27/19   Referring Physician Felipe Montalvo MD   Patient/Family Goals Statement Return home   Pertinent History of Current Problem (include personal factors and/or comorbidities that impact the POC) Pt is 74 year old male admitted with slurred speech and TIA symptoms. Pt found to have occlusion of small vessel which led to ischemic stroke. Recent complaints of R sided weakness of UE and LE. PMH: elevated CRP, hyperlipidemia, homocystenemia, R LE fx.    Precautions/Limitations fall precautions   Weight-Bearing Status - LLE full weight-bearing   Weight-Bearing Status - RLE full weight-bearing   General Observations Supine in bed with spouse at bedside   Cognitive Status Examination   Orientation orientation to person, place and time   Level of Consciousness alert   Follows Commands and Answers Questions 100% of the time   Personal Safety and Judgment intact   Pain Assessment   Patient Currently in Pain No   Integumentary/Edema  "  Integumentary/Edema no deficits were identifed   Posture    Posture Forward head position;Protracted shoulders;Kyphosis   Range of Motion (ROM)   ROM Comment B UE and LE WFL   Strength   Strength Comments L LE seated strength 5/5 for hip flexion, knee extension, knee flexion, ankle DF, hip ABD/ADD. R LE strength tested 4+/5: hip flexion, knee extension/flexion, ankle DF, hip ABD/ADD.   Bed Mobility   Bed Mobility Comments IND in supine<>sit   Transfer Skills   Transfer Comments IND in sit<>stand, supervision for bed<>chair transfer.   Gait   Gait Comments Pt ambulated 10' in room with SBA.   Balance   Balance Comments Pt scored 51/56 on Davis balance, see davis balance FS for details.   General Therapy Interventions   Planned Therapy Interventions balance training;gait training   Clinical Impression   Criteria for Skilled Therapeutic Intervention yes, treatment indicated   PT Diagnosis R sided weakness and decreased functional mobility secondary to TIA.   Influenced by the following impairments Decreased strength, impaired high level balance, fall risk   Functional limitations due to impairments Decreased independence for functional mobility.   Clinical Presentation Stable/Uncomplicated   Clinical Presentation Rationale Medically stable   Clinical Decision Making (Complexity) Low complexity   Therapy Frequency Daily   Predicted Duration of Therapy Intervention (days/wks) 2 days   Anticipated Discharge Disposition Home with Assist;Home with Outpatient Therapy   Risk & Benefits of therapy have been explained Yes   Patient, Family & other staff in agreement with plan of care Yes   [facility] TM \"6 Clicks\"   2016, Trustees of [facility], under license to [employer].  All rights reserved.   6 Clicks Short Forms Basic Mobility Inpatient Short Form   [facility] AM-PAC  \"6 Clicks\" V.2 Basic Mobility Inpatient Short Form   1. Turning from your back to your side while in a flat bed without using " bedrails? 4 - None   2. Moving from lying on your back to sitting on the side of a flat bed without using bedrails? 4 - None   3. Moving to and from a bed to a chair (including a wheelchair)? 4 - None   4. Standing up from a chair using your arms (e.g., wheelchair, or bedside chair)? 4 - None   5. To walk in hospital room? 4 - None   6. Climbing 3-5 steps with a railing? 4 - None   Basic Mobility Raw Score (Score out of 24.Lower scores equate to lower levels of function) 24   Total Evaluation Time   Total Evaluation Time (Minutes) 15

## 2019-08-28 NOTE — PLAN OF CARE
Discharge Planner OT   Patient plan for discharge: Home   Current status: Orders received, Eval complete, and treatment initiated.   Pt was previously IND for ADLs and IADLs.     Currently pt is able to ambulate in his room I'ly. Pt was able to complete LB dressing with retrieval and a toilet transfer I'ly. In  Order to further assess incoordination pt was given  strength, pinch strength, and 9 hole peg test. Norms for pinch strength are 18.1 lbs R hand and 18.8 lbs L hand. Pt scored 21 lbs for BUE. Norms for  strength are 78 lbs R hand and 64 L hand. Pt scored a 80lbs  for R hand and 85 lbs  for L hand. Norms for 9 hole peg test are 25.79 seconds for R hand and 25.95 seconds for L hand. Pt completed 9 hole peg test in 28.11seconds  for R hand and 20.5 seconds for L hand. Pt was given hand coordination HEP and verbalized understanding.   Barriers to return to prior living situation: None   Recommendations for discharge: Home with HEP for R hand incoordination  Rationale for recommendations:   Pt is at baseline with ADLs. Pt was given home HEP for R hand incoordination since he does not require outpatient therapy to address slight deficits. Pt is safe from OT standpoint to discharge home        Entered by: Parveen Hernández 08/28/2019 2:23 PM

## 2019-08-28 NOTE — PROGRESS NOTES
08/28/19 1001   General Information   Onset Date 08/27/19   Start of Care Date 08/28/19   Referring Physician Dr. Kwong   Patient Profile Review/OT: Additional Occupational Profile Info See Profile for full history and prior level of function   Patient/Family Goals Statement He did not state.    Swallowing Evaluation Bedside swallow evaluation   Behaviorial Observations Alert   Mode of current nutrition Oral diet   Type of oral diet Regular;Thin liquid   Respiratory Status Room air   Comments Jose Alfredo Caceres is a 74 year old male admitted on 8/27/2019. He presents with right sided weakness concerning for a CVA. MRI revealed a left pontine stroke.    Clinical Swallow Evaluation   Oral Musculature generally intact   Structural Abnormalities none present   Dentition present and adequate   Mucosal Quality adequate   Mandibular Strength and Mobility intact   Oral Labial Strength and Mobility impaired retraction   Lingual Strength and Mobility impaired right lateral movement   Velar Elevation intact   Buccal Strength and Mobility intact   Laryngeal Function Cough;Throat clear;Swallow;Voicing initiated;Dry swallow palpated   Additional Documentation Yes   Swallow Eval   Feeding Assistance set up only required   Clinical Swallow Eval: Thin Liquid Texture Trial   Mode of Presentation, Thin Liquids cup;straw;self-fed   Volume of Liquid or Food Presented 6 oz of water   Oral Phase of Swallow WFL   Pharyngeal Phase of Swallow intact   Clinical Swallow Eval: Puree Solid Texture Trial   Mode of Presentation, Puree spoon;self-fed   Volume of Puree Presented 3 oz of pudding   Oral Phase, Puree WFL   Pharyngeal Phase, Puree intact   Clinical Swallow Eval: Solid Food Texture Trial   Mode of Presentation, Solid self-fed   Volume of Solid Food Presented 1 diallo cracker   Oral Phase, Solid Residue in oral cavity   Oral Residue, Solid right anterior lateral sulci   Pharyngeal Phase, Solid impaired;repeated swallows   Diagnostic  Statement No Sx of aspiration.    Swallow Compensations   Swallow Compensations Alternate viscosity of consistencies;Pacing;Reduce amounts;Multiple swallow   Results No compensations were used   Swallow Eval: Clinical Impressions   Skilled Criteria for Therapy Intervention No problems identified which require skilled intervention   Functional Assessment Scale (FAS) 6   Treatment Diagnosis Minimal oral and pharyngeal dysphagia   Diet texture recommendations Regular diet;Thin liquids   Recommended Feeding/Eating Techniques alternate between small bites and sips of food/liquid;maintain upright posture during/after eating for 30 mins;small sips/bites   Therapy Frequency   (Eval only)   Anticipated Discharge Disposition home w/ outpatient services   Risks and Benefits of Treatment have been explained. Yes   Patient, family and/or staff in agreement with Plan of Care Yes   Clinical Impression Comments Patient presents with minimal oral and pharyngeal dysphagia at bedside. Minimal dysarthria noted in conversation characterized by imprecise articulation and some word finding deficits noted. Patient reports that his speech is still off. He was able to tolerate thin liquids via the cup and straw without overt Sx of aspiration. Mastication was mildly prolonged for a solid with minimal oral residue in the right lateral sulci, that he independently cleared. No Sx of of aspiration, no reported globus sensation or vocal changes noted. No skilled intervention is indicated for swallowing. Recommend: 1. May continue on a regular diet and thin liquids. 2. Upright, avoid straws, small bites/sips, alternate liquids/solids as needed.    Total Evaluation Time   Total Evaluation Time (Minutes) 18

## 2019-08-30 ENCOUNTER — HOSPITAL ENCOUNTER (OUTPATIENT)
Dept: OCCUPATIONAL THERAPY | Facility: CLINIC | Age: 74
Setting detail: THERAPIES SERIES
End: 2019-08-30
Attending: HOSPITALIST
Payer: COMMERCIAL

## 2019-08-30 DIAGNOSIS — I63.00 CEREBROVASCULAR ACCIDENT (CVA) DUE TO THROMBOSIS OF PRECEREBRAL ARTERY (H): ICD-10-CM

## 2019-08-30 PROCEDURE — 97165 OT EVAL LOW COMPLEX 30 MIN: CPT | Mod: GO | Performed by: OCCUPATIONAL THERAPIST

## 2019-08-30 PROCEDURE — 97535 SELF CARE MNGMENT TRAINING: CPT | Mod: GO | Performed by: OCCUPATIONAL THERAPIST

## 2019-09-04 NOTE — PROGRESS NOTES
"   08/30/19 1300   Quick Adds   Type of Visit Initial Outpatient Occupational Therapy Evaluation   General Information   Start Of Care Date 08/30/19   Referring Physician Wyatt Hooker, DO   Orders Evaluate and treat as indicated   Other Orders PT and SLP   Orders Date 08/30/19   Medical Diagnosis Cerebrovascular accident (CVA) due to thrombosis of precerebral artery   Onset of Illness/Injury or Date of Surgery 08/28/19   Surgical/Medical History Reviewed Yes   Additional Occupational Profile Info/Pertinent History of Current Problem 73 yo male who presented with R sided weakness. Pt was a concern for CVA however, CT was negative for abnormality. MRI was conducted finding small L pontine abnormality. D/t consistent incoordination and slight weakness it was determined that the pt had a small ischemic stroke d/t small vessel occlusion.   Comments/Observations \"I can't do anything physical now\"  Patient reports he is 10% back to baseline (needs to work on mobility, RUE strength and coordination, handwriting).  Wife Angelita has colon cancer and patient has been the primary caregiver for her the last year.    Role/Living Environment   Current Community Support Family/friend caregiver   Patient role/Employment history Retired  (teacher)   Community/Avocational Activities Hobbies: downhill skiing, snow shoe, cross country ski, splitting wood/logs, traveling [place], make bird houses (uses power tool).    Current Living Environment House   Number of Stairs to Enter Home 3   Number of Stairs Within Home   (13 upstairs and 11 downstairs)   Primary Bathroom Location/Comments Main   Primary Bathroom Set Up/Equipment Tub/Shower combo;Shower grab bar   Prior Level - Transfers Independent   Prior Level - Ambulation Independent   Prior Level - ADLS Independent   Prior Responsibilities - IADL Meal Preparation;Housekeeping;Laundry;Shopping;Yardwork;Finances;Driving   Current Assistive Devices - Mobility   (none) "   Patient/family Goals Statement To get back to normal, to use my R hand automatically   Pain   Patient currently in pain No   Fall Risk Screen   Fall screen completed by PT   Cognitive Status Examination   Orientation Orientation to person, place and time   Level of Consciousness Alert   Follows Commands and Answers Questions 100% of the time;Able to follow multistep instructions   Personal Safety and Judgment Intact   Memory Intact   Cognitive Comment Will further assess.   Visual Perception   Visual Perception Comments Possible inatttention as patient walking very close to R side of hallway, bumping into wall.   Sensation   Sensation Comments Can feel hot/cold, denies sensation changes.   Range of Motion (ROM)   ROM Comments LUE WNLs.  RUE:  WNLs within all planes.     Strength   Strength Comments LUE 5/5.  RUE:  Shoulder flexion 4+/5, shoulder abduction 4/5, elbow flexion/extension 5/5.   Hand Strength   Hand Dominance Right   Left Hand  (pounds) 92 pounds   Right Hand  (pounds) 61 pounds   Left Lateral Pinch (pounds) 17.5 pounds   Right Lateral Pinch (pounds) 19 pounds   Left Three Point Pinch (pounds) 17 pounds   Right Three Point Pinch (pounds) 18 pounds   Hand Strength Comments Patient has teal putty with beads.  Patient's R  strength falls 1 SD below the mean for his age group.     Coordination   Upper Extremity Coordination Right UE impaired   Gross Motor Coordination Difficulty reaching into cupboards, washing his hair, brushing his teetch   Left Hand, Nine Hole Peg Test (seconds) 25   Right Hand, Nine Hole Peg Test (seconds) 32   Left Hand, Box and Blocks Test (cubes transferred in 1 minute) 61   Right Hand, Box and Blocks Test (cubes transferred in 1 minute) 41   Coordination Comments Moderate compensation through R shoulder with FMC tasks.  FMC falls 2 SD below the mean for his age group.     Functional Mobility   Functional Mobility Comments R leg shorter than his L (reports feeling  "unsteady before CVA).   Transfer Skills   Transfer Comments Independent   Transfer Skill   Level of [place]: Transfers independent   Toilet Transfer   Toilet Transfer Comments Reports no difficulties.  Uses vanity close by.   Tub/Shower Transfer   Tub/Shower Transfer Comments Patient lowers to the bottom of the tub to bathe (does not have a shower)   Bathing   Level of [place] - Bathing independent   Upper Body Dressing   Level of [place]: Dress Upper Body independent   Upper Body Dressing Comments Hasn't tried buttoning   Lower Body Dressing   Level of [place]: Dress Lower Body independent   Lower Body Dressing Comments Increased time with getting socks on.     Toileting   Level of [place]: Toilet independent   Grooming   Grooming Comments Difficulty with brushing teeth and shaving.   Eating/Self-Feeding   Eating/Self Feeding Comments Decreased  through R hand to use utensils.  Uses 2 hands for drinking from a cup.   Activity Tolerance   Activity Tolerance Patient has been \"more lethargic\", feels it may be due to medications.  Patient reports tossing/turning at night, hasn't slept well.     Adult OT Eval Goals   OT Eval Goals (Adult) 1;2;3;4;5;6    OT Goal 1   Goal Identifier 1-Fatigue   Goal Description Patient to verbalize 3 strategies for energy conservation/work simplification and fatigue management for improved independence with leisure activities, yardwork, etc.   Target Date 11/28/19    OT Goal 2   Goal Identifier 2-FMC   Goal Description Patient to demonstrate improved R hand coordination by completing the 9-Hole-Peg Test in 25 seconds for increased independence with FM ADLs (manipulating buttons, zippers, handwriting, etc.).   Target Date 11/28/19    OT Goal 3   Goal Identifier 3-GMC   Goal Description Patient to improve R GM coordination skills for increased independence during ADL/IADL tasks (dressing, cooking, etc.) by completing the Box and Blocks Test with RUE with 50 " blocks.   Target Date 11/28/19   OT Goal 4   Goal Identifier 4- strength   Goal Description  Patient to demonstrate improved R  strength to 90# for increased ADL/IADL independence (yardwork, leisure activities) and return to baseline functioning.   Target Date 11/28/19   OT Goal 5   Goal Identifier 5-Driving   Goal Description Patient will demonstrate modified I with visual scanning, reaction time, divided attention, and problem solving to ensure safety for community mobility (i.e. driving, crossing the street, pathfinding, shopping, etc.)    Target Date 11/28/19    OT Goal 6   Goal Identifier 6-Handwriting   Goal Description Patient will demonstrate and report 50% improvement in handwriting legibility using adaptive equipment and adapted techniques for increased success and independence with this everyday skill.   Target Date 11/28/19   Clinical Impression   Criteria for Skilled Therapeutic Interventions Met Yes, treatment indicated   OT Diagnosis decreased independence with ADL/IADLs   Influenced by the following impairments RUE weakness and incordination, fatigue, impaired mobility   Assessment of Occupational Performance 3-5 Performance Deficits   Identified Performance Deficits affects ADL/IADL, household/community mobility, work, driving, recreational activities   Clinical Decision Making (Complexity) Low complexity   Therapy Frequency 2x/week   Predicted Duration of Therapy Intervention (days/wks) 8   Risks and Benefits of Treatment have been explained. Yes   Patient, Family & other staff in agreement with plan of care Yes   Education Assessment   Barriers To Learning No Barriers   Preferred Learning Style Listening;Demonstration;Reading;Pictures/video   Total Evaluation Time   OT Slime Low Complexity Minutes (15979) 35

## 2019-09-04 NOTE — PROGRESS NOTES
[facility]          OUTPATIENT OCCUPATIONAL THERAPY  EVALUATION  PLAN OF TREATMENT FOR OUTPATIENT REHABILITATION  (COMPLETE FOR INITIAL CLAIMS ONLY)  Patient's Last Name, First Name, M.I.  YOB: 1945  Jose Alfredo Caceres                        Provider's Name  [facility] Medical Record No.  [id number]                               Onset Date:     08/28/19   Start of Care Date:     08/30/19   Type:     ___PT   _X_OT   ___SLP Medical Diagnosis:     Cerebrovascular accident (CVA) due to thrombosis of precerebral artery                          OT Diagnosis:     decreased independence with ADL/IADLs Visits from SOC:  1   _________________________________________________________________________________  Plan of Treatment/Functional Goals:                       Goals  Goal Identifier: 1-Fatigue  Goal Description: Patient to verbalize 3 strategies for energy conservation/work simplification and fatigue management for improved independence with leisure activities, yardwork, etc.  Target Date: 11/28/19     Goal Identifier: 2-FMC  Goal Description: Patient to demonstrate improved R hand coordination by completing the 9-Hole-Peg Test in 25 seconds for increased independence with FM ADLs (manipulating buttons, zippers, handwriting, etc.).  Target Date: 11/28/19     Goal Identifier: 3-GMC  Goal Description: Patient to improve R GM coordination skills for increased independence during ADL/IADL tasks (dressing, cooking, etc.) by completing the Box and Blocks Test with RUE with 50 blocks.  Target Date: 11/28/19     Goal Identifier: 4- strength  Goal Description:  Patient to demonstrate improved R  strength to 90# for increased ADL/IADL independence (yardwork, leisure activities) and return to baseline functioning.  Target Date: 11/28/19     Goal Identifier: 5-Driving  Goal Description:  Patient will demonstrate modified I with visual scanning, reaction time, divided attention, and problem solving to ensure safety for community mobility (i.e. driving, crossing the street, pathfinding, shopping, etc.)   Target Date: 11/28/19     Goal Identifier: 6-Handwriting  Goal Description: Patient will demonstrate and report 50% improvement in handwriting legibility using adaptive equipment and adapted techniques for increased success and independence with this everyday skill.  Target Date: 11/28/19                    Therapy Frequency: 2x/week     Predicted Duration of Therapy Intervention (days/wks): 8  Nguyen Cowan OT          I CERTIFY THE NEED FOR THESE SERVICES FURNISHED UNDER        THIS PLAN OF TREATMENT AND WHILE UNDER MY CARE .             Physician Signature               Date    X_____________________________________________________                Certification date from: 08/30/19, Certification date to: 11/28/19               Referring Physician: Wyatt Hooker DO, primary is Kendell Navarrete     Initial Assessment        See Epic Evaluation      Start Of Care Date: 08/30/19

## 2019-09-05 ENCOUNTER — HOSPITAL ENCOUNTER (OUTPATIENT)
Dept: SPEECH THERAPY | Facility: CLINIC | Age: 74
Setting detail: THERAPIES SERIES
End: 2019-09-05
Attending: HOSPITALIST
Payer: COMMERCIAL

## 2019-09-05 DIAGNOSIS — I63.00 CEREBROVASCULAR ACCIDENT (CVA) DUE TO THROMBOSIS OF PRECEREBRAL ARTERY (H): ICD-10-CM

## 2019-09-05 PROCEDURE — 92522 EVALUATE SPEECH PRODUCTION: CPT | Mod: GN | Performed by: SPEECH-LANGUAGE PATHOLOGIST

## 2019-09-05 PROCEDURE — 92507 TX SP LANG VOICE COMM INDIV: CPT | Mod: GN | Performed by: SPEECH-LANGUAGE PATHOLOGIST

## 2019-09-05 NOTE — PROGRESS NOTES
[facility]          OUTPATIENT SPEECH LANGUAGE PATHOLOGY LANGUAGE-COGNITION  EVALUATION  PLAN OF TREATMENT FOR OUTPATIENT REHABILITATION  (COMPLETE FOR INITIAL CLAIMS ONLY)  Patient's Last Name, First Name, M.I.  YOB: 1945  Jose Alfredo Caceres                        Provider s Name: [facility] Medical Record No.  [id number]     Onset Date:  08/27/19   Start of Care Date: 09/05/19   Type:     ___PT  __OT   _X_SLP    Medical Diagnosis: CVA     Speech Language Pathology Diagnosis:   Mild dysarthria    Visits from SOC: 1                                        ________________________________________________________________________________  Plan of Treatment/Functional Goals:   Planned Therapy Interventions: Communication   Intervention Comments: OMEs    Communication Goals   1. Goal Identifier: LTG1: OME       Goal Description: Pt will complete 5-10 rep. of each trained OME provided zero to min cues in order to demonstrate clear speech when speaking with his wife.       Target Date: 10/31/19   2. Goal Identifier: LTG2: Dysarthria       Goal Description: Patient will learn, implement, and demonstrate independent use of speech intelligibility strategies to increase intelligibility at the sentence, extended reading and spontaneous conversation level to 95% accuracy in order to speak clearly on the phone.       Target Date: 10/31/19           Predicted Duration of Therapy Intervention (days/wks): 1x per week for 4 weeks pending progress    Mary Ellen Richey, SLP       I CERTIFY THE NEED FOR THESE SERVICES FURNISHED UNDER        THIS PLAN OF TREATMENT AND WHILE UNDER MY CARE .             Physician Signature               Date    X_____________________________________________________                        Certification Date From:   9/5/19  Certification Date To:     10/31/19         Referring  Physician:  Dr. PAULO Hooker DO  PCP: Dr. Kendell Navarrete MD     Initial Assessment        See Epic Evaluation Start Of Care Date: 09/05/19

## 2019-09-05 NOTE — PROGRESS NOTES
"     Speech-Language Pathology Department   EVALUATION  [facility]    Dysarthria Evaluation   09/05/19 1200       Present No   General Information   Type of Evaluation Dysarthria   Type Of Visit Initial   Start Of Care Date 09/05/19   Referring Physician Dr. PAULO Hooker, DO   Orders Evaluate And Treat   Medical Diagnosis CVA   Onset Of Illness/injury Or Date Of Surgery 08/27/19   Precautions/Limitations  no known precautions/limitations   Hearing WFL   Surgical/Medical history reviewed Yes   Pertinent History Of Current Problem Pt went to ED on 8/27/19 and was diagnosed with a CVA presenting with dysphagia and dysarthria.  IP SLP completed a bedside swallow eval on 8/28/19; IP SLP recommended reg. diet with thin liquids and use of compensatory strategies including alt small bite/small sip, and upright posture.  Today pt. reported no further dysphagia complications. Pt reports having difficulty with speaking clearly and sounding \"garbled\" or unclear.   Prior Level Of Function Comment No previous concerns with speech or dysphagia   Current Community Support  Family/friend caregiver   Patient Role/employment History Retired  ()   Living environment [place]   General Observations Pt pleasant, alert, engaged.   Patient/family Goals Improve speech intelligibility for communication with spouse.   General Information Comments Pt presented with inappropriate emotional response (uncontrolled laughter)   Fall Risk Screen   Fall screen completed by PT   Pain Assessment   Pain Reported No   Oral Motor Sensory Function   Completed on Swallow Evaluation Other (Comment)  (Oral Mech Exam)   Deficits noted in Labial Function (m-VII, S-V) None   Deficits noted in Lingual Function (m-XII, s-V, VII, IX, XII) Coordination   Deficits noted in Mandibular Function (m-V) None   Deficits noted in Laryngeal Function (m-X, s-X) Respiration  (Pacing)   Functional " Assessment Scale (Oral Motor) Mild Impairment   Speech   Deficits in Speech Respiration Shallow   Deficits in Phonation Loudness (soft)   Underlying oral motor deficit reduced lingual coordination   Deficits in Resonance None   Deficits in Prosody None   AIDS-words, % intelligible 96%  (Pt had one SC)   AIDS-sentences, % intelligible 97%  (Pt had one SC)   Speech Comments Pt. presented with mumbled and quiet speech.  presented with some disfluencies as well as forced exhilation at the end of longer utterances.   Education Assessment   Barriers to Learning No barriers   Preferred Learning Style Listening;Reading;Demonstration;Pictures/video   General Therapy Interventions   Planned Therapy Interventions Communication   Communication Improve speech intelligibility   Intervention Comments OMEs   Clinical Impression, SLP Eval   Criteria for Skilled Therapeutic Interventions Met (SLP Eval) skilled criteria for speech language intervention met   SLP Diagnosis Mild dysarthria   Influenced by the following factors/impairments Fatigue   Functional limitations due to impairments impercise articulation at conversational level   Therapy Frequency 1 time;per week   Predicted Duration of Therapy Intervention (days/wks) 4 weeks pending progress   Risks and Benefits of Treatment have been explained. Yes   Patient, Family & other staff in agreement with plan of care Yes   Clinical Impression Comments Pt demonstrates mild dysarthria at the word, phrase, sentence, and conversational levels of speech.  Symptoms include: imprecise articulation, sound/word/phrase level dysfluencies, as well as impaired phrasing during connected speech.  Speech symptoms are impairing Pt's ability to communicate easily and effectively with others. RECOMMEND: a course of skilled speech therapy targeting speech intelligibly strategies for improved speech clarity for daily functional communication as well as training in oral motor exercises in order to  improve strength, ROM, and coordination of movement of articulators for precise communication.   Cognitive/Communication Goals   Cognitive/Communication Goals 2;1   Cognitive/Communication Goal 1   Goal Identifier LTG1: OME   Goal Description Pt will complete 5-10 rep. of each trained OME provided zero to min cues in order to demonstrate clear speech when speaking with his wife.   Target Date 10/31/19   Cognitive/Communication Goal 2   Goal Identifier LTG2: Dysarthria   Goal Description Patient will learn, implement, and demonstrate independent use of speech intelligibility strategies to increase intelligibility at the sentence, extended reading and spontaneous conversation level to 95% accuracy in order to speak clearly on the phone.   Target Date 10/31/19   Total Session Time   Sound production (artic, phonology, apraxia, dysarthria) Minutes (89128) 37   Total Evaluation Time 37  (+8 min Tx)       Thank you for referring Jose Alfredo Caceres  to outpatient therapy at  adult [facility] in [place].  Please call Mary Ellen Richey MA, SLP-CCC at [phone] or email [email or web address] with any questions or concerns.      Mary Ellen Richey M.A., SLP-CCC  Speech-Language Pathologist

## 2019-09-09 ENCOUNTER — HOSPITAL ENCOUNTER (OUTPATIENT)
Dept: PHYSICAL THERAPY | Facility: CLINIC | Age: 74
Setting detail: THERAPIES SERIES
End: 2019-09-09
Attending: HOSPITALIST
Payer: COMMERCIAL

## 2019-09-09 DIAGNOSIS — I63.00 CEREBROVASCULAR ACCIDENT (CVA) DUE TO THROMBOSIS OF PRECEREBRAL ARTERY (H): ICD-10-CM

## 2019-09-09 PROCEDURE — 97110 THERAPEUTIC EXERCISES: CPT | Mod: GP | Performed by: PHYSICAL THERAPIST

## 2019-09-09 PROCEDURE — 97162 PT EVAL MOD COMPLEX 30 MIN: CPT | Mod: GP | Performed by: PHYSICAL THERAPIST

## 2019-09-09 ASSESSMENT — 6 MINUTE WALK TEST (6MWT): TOTAL DISTANCE WALKED (FT): 1125

## 2019-09-09 NOTE — PROGRESS NOTES
09/09/19 0900   Quick Adds   Quick Adds Certification   Type of Visit Initial OP PT Evaluation   General Information   Start of Care Date 09/09/19   Referring Physician Wyatt Hooker, DO   Orders Evaluate and Treat as Indicated   Additional Orders SLP/OT   Order Date 08/30/19   Medical Diagnosis Cerebrovascular accident (CVA) due to thrombosis of precerebral artery   Onset of illness/injury or Date of Surgery 08/28/19   Surgical/Medical history reviewed Yes   Pertinent history of current problem (include personal factors and/or comorbidities that impact the POC) 75 yo male who presented with R sided weakness. Pt was a concern for CVA however, CT was negative for abnormality. MRI was conducted finding small L pontine abnormality. D/t consistent incoordination and slight weakness it was determined that the pt had a small ischemic stroke d/t small vessel occlusion. Pt reports an old skiing injury and lost length in his right leg from a surgery.   Prior level of function comment Very active, owns a cabin on the [facility] and splits own wood, acitve alpine skiing and cross country skiiing.   Current Community Support Family/friend caregiver   Patient role/Employment history Retired   Living environment [place]   Home/Community Accessibility Comments steps to basement and 2nd floor with a rail, reports he used to 'run' up the steps and never used the railing. Wife present and supportive-has a history of colon cancer and patient was the primary caregiver. Pt reports he is not sleeping at night, last night he got 1 hour of sleep. Prior to stoke was not a good sleeper,but this is worse. Feels 'guilty' about napping during the day.   Assistive Devices Comments no AD   Patient/Family Goals Statement I want to do all the things I was doing before.    General Information Comments FACIT: 16/52   Fall Risk Screen   Fall screen completed by PT   Have you fallen 2 or more times in the past year? No   Have you  fallen and had an injury in the past year? No   Timed Up and Go score (seconds) 10.3s   Is patient a fall risk? No   Fall screen comments no AD   Pain   Patient currently in pain No   Cognitive Status Examination   Orientation orientation to person, place and time   Level of Consciousness alert   Follows Commands and Answers Questions 100% of the time   Personal Safety and Judgment intact   Memory intact   Observation   Observation Facial droop/mild paralysis at right side   Range of Motion (ROM)   ROM Quick Adds no deficits were identified   Strength   Strength Comments hip flexion 4/5, hip abduction 4-/5 on right   Bed Mobility   Bed Mobility Comments independent   Transfer Skills   Transfer Comments independent without UE assist to rise from chair.   Gait   Gait Comments ambulates unassisted into clinic, vaulting on left side to clear right foot. Right knee stays in extension presenting more of an antaligic looking gait pattern potentially due to hip weakness.    Gait Special Tests Functional Gait Assessment Score out of 30   Score out of 30 18   Gait Special Tests Six Minute Walk Test   Feet 1125 Feet   Comments no AD. RPE 6/10 at end of duration   Balance   Balance Comments MARTINEZ from hospital 51/56, FGA 18/30.   Balance Special Tests Martinez Balance   Score out of 56 51   Comments assessed in hospital prior to discharge   Sensory Examination   Sensory Perception Comments denies N/T   Coordination   Coordination Comments impaired right>Left, slow to tap, fatigues quickly.   Modality Interventions   Planned Modality Interventions Comments per therapist discretion   Planned Therapy Interventions   Planned Therapy Interventions IADL retraining;gait training;motor coordination training;neuromuscular re-education;strengthening;stretching;transfer training;manual therapy;other (see comments)   Planned Therapy Interventions Comment as needed per therapist discretion   Clinical Impression   Criteria for Skilled Therapeutic  Interventions Met yes, treatment indicated   PT Diagnosis difficutly with gait   Influenced by the following impairments fatigue, poor sleep hygiene, hip weakness, decreased ability from baseline, incoordination UE/LE, impaired gait mechanice-vaulting, decreased arm swing, multi level home   Functional limitations due to impairments impaired gait pattern, strength limits previous activities (wood splitting), unable to drive, fatigue limits tolerance for everything (fatigue after eating a pancake).   Clinical Presentation Stable/Uncomplicated   Clinical Presentation Rationale fatigue and sleep limiting current progress   Clinical Decision Making (Complexity) Moderate complexity   Therapy Frequency 2 times/Week   Predicted Duration of Therapy Intervention (days/wks) 6 weeks   Risk & Benefits of therapy have been explained Yes   Patient, Family & other staff in agreement with plan of care Yes   Education Assessment   Preferred Learning Style Listening   Barriers to Learning No barriers   GOALS   PT Eval Goals 1;2;3;4   Goal 1   Goal Identifier FACIT   Goal Description 1. Patient will improve score on the fatigue index scale by greater than 32/52 in order to increase energy needed to perform regular household tasks and increase aerobic tolerance for exercise   Target Date 12/07/19   Goal 2   Goal Identifier 6MWT   Goal Description 2. Patient will improve distance ambulated on the 6 minute walk test by greater than 150 feet by improving gait speed, arm swing and decrease accessory vaulting to improve walking efficiency in the community.   Target Date 12/07/19   Goal 3   Goal Identifier FGA   Goal Description 3. Patient will improve score on the functional gait assessment to greater than 24/30 to decrease fall risk and improve safety at home.   Target Date 12/07/19   Goal 4   Goal Identifier ACTIVITY TOLERANCE   Goal Description 4. Patient will improve walking tolerance to greater than 20+ minutes of moderate intensity  exercise in order to resume prior levels of aerobic exercise including alpine and cross country skiing.   Target Date 12/07/19   Total Evaluation Time   PT Eval, Moderate Complexity Minutes (35739) 32   Therapy Certification   Certification date from 09/09/19   Certification date to 12/07/19   Medical Diagnosis Cerebrovascular accident (CVA) due to thrombosis of precerebral artery

## 2019-09-09 NOTE — PROGRESS NOTES
[facility]        OUTPATIENT PHYSICAL THERAPY FUNCTIONAL EVALUATION  PLAN OF TREATMENT FOR OUTPATIENT REHABILITATION  (COMPLETE FOR INITIAL CLAIMS ONLY)  Patient's Last Name, First Name, M.I.  YOB: 1945  Jose Alfredo Caceres     Provider's Name   [facility]   Medical Record No.  [id number]     Start of Care Date:  09/09/19   Onset Date:  08/28/19   Type:     _X__PT   ____OT  ____SLP Medical Diagnosis:  Cerebrovascular accident (CVA) due to thrombosis of precerebral artery     PT Diagnosis:  difficutly with gait Visits from SOC:  1                              __________________________________________________________________________________  Plan of Treatment/Functional Goals:  IADL retraining, gait training, motor coordination training, neuromuscular re-education, strengthening, stretching, transfer training, manual therapy, other (see comments)  as needed per therapist discretion        GOALS  FACIT  1. Patient will improve score on the fatigue index scale by greater than 32/52 in order to increase energy needed to perform regular household tasks and increase aerobic tolerance for exercise  12/07/19    6MWT  2. Patient will improve distance ambulated on the 6 minute walk test by greater than 150 feet by improving gait speed, arm swing and decrease accessory vaulting to improve walking efficiency in the community.  12/07/19    FGA  3. Patient will improve score on the functional gait assessment to greater than 24/30 to decrease fall risk and improve safety at home.  12/07/19    ACTIVITY TOLERANCE  4. Patient will improve walking tolerance to greater than 20+ minutes of moderate intensity exercise in order to resume prior levels of aerobic exercise including alpine and cross country skiing.  12/07/19    Therapy Frequency:  2 times/Week   Predicted Duration of Therapy  Intervention:  6 weeks    Gris Ryan, PT                                    I CERTIFY THE NEED FOR THESE SERVICES FURNISHED UNDER        THIS PLAN OF TREATMENT AND WHILE UNDER MY CARE     (Physician co-signature of this document indicates review and certification of the therapy plan).                Certification Date From:  09/09/19   Certification Date To:  12/07/19    Referring Provider:  Wyatt Hooker DO referring  Primary Dr. Kendell Navarrete    Initial Assessment  See Epic Evaluation- Start of Care Date: 09/09/19

## 2019-09-11 ENCOUNTER — HOSPITAL ENCOUNTER (OUTPATIENT)
Dept: OCCUPATIONAL THERAPY | Facility: CLINIC | Age: 74
Setting detail: THERAPIES SERIES
End: 2019-09-11
Attending: HOSPITALIST
Payer: COMMERCIAL

## 2019-09-11 ENCOUNTER — HOSPITAL ENCOUNTER (OUTPATIENT)
Dept: PHYSICAL THERAPY | Facility: CLINIC | Age: 74
Setting detail: THERAPIES SERIES
End: 2019-09-11
Attending: HOSPITALIST
Payer: COMMERCIAL

## 2019-09-11 PROCEDURE — 97537 COMMUNITY/WORK REINTEGRATION: CPT | Mod: GO | Performed by: OCCUPATIONAL THERAPIST

## 2019-09-11 PROCEDURE — 97110 THERAPEUTIC EXERCISES: CPT | Mod: GP | Performed by: PHYSICAL THERAPIST

## 2019-09-11 PROCEDURE — 97535 SELF CARE MNGMENT TRAINING: CPT | Mod: GO | Performed by: OCCUPATIONAL THERAPIST

## 2019-09-13 ENCOUNTER — HOSPITAL ENCOUNTER (OUTPATIENT)
Dept: OCCUPATIONAL THERAPY | Facility: CLINIC | Age: 74
Setting detail: THERAPIES SERIES
End: 2019-09-13
Attending: HOSPITALIST
Payer: COMMERCIAL

## 2019-09-13 ENCOUNTER — HOSPITAL ENCOUNTER (OUTPATIENT)
Dept: SPEECH THERAPY | Facility: CLINIC | Age: 74
Setting detail: THERAPIES SERIES
End: 2019-09-13
Attending: HOSPITALIST
Payer: COMMERCIAL

## 2019-09-13 ENCOUNTER — MEDICAL CORRESPONDENCE (OUTPATIENT)
Dept: HEALTH INFORMATION MANAGEMENT | Facility: CLINIC | Age: 74
End: 2019-09-13

## 2019-09-13 PROCEDURE — 97537 COMMUNITY/WORK REINTEGRATION: CPT | Mod: GO | Performed by: OCCUPATIONAL THERAPIST

## 2019-09-13 PROCEDURE — 92507 TX SP LANG VOICE COMM INDIV: CPT | Mod: GN | Performed by: SPEECH-LANGUAGE PATHOLOGIST

## 2019-09-23 ENCOUNTER — HOSPITAL ENCOUNTER (OUTPATIENT)
Dept: PHYSICAL THERAPY | Facility: CLINIC | Age: 74
Setting detail: THERAPIES SERIES
End: 2019-09-23
Attending: HOSPITALIST
Payer: COMMERCIAL

## 2019-09-23 PROCEDURE — 97112 NEUROMUSCULAR REEDUCATION: CPT | Mod: GP | Performed by: PHYSICAL THERAPIST

## 2019-09-23 PROCEDURE — 97110 THERAPEUTIC EXERCISES: CPT | Mod: GP | Performed by: PHYSICAL THERAPIST

## 2019-09-23 PROCEDURE — 97116 GAIT TRAINING THERAPY: CPT | Mod: GP | Performed by: PHYSICAL THERAPIST

## 2019-09-26 ENCOUNTER — HOSPITAL ENCOUNTER (OUTPATIENT)
Dept: SPEECH THERAPY | Facility: CLINIC | Age: 74
Setting detail: THERAPIES SERIES
End: 2019-09-26
Attending: HOSPITALIST
Payer: COMMERCIAL

## 2019-09-26 ENCOUNTER — HOSPITAL ENCOUNTER (OUTPATIENT)
Dept: PHYSICAL THERAPY | Facility: CLINIC | Age: 74
Setting detail: THERAPIES SERIES
End: 2019-09-26
Attending: HOSPITALIST
Payer: COMMERCIAL

## 2019-09-26 PROCEDURE — 97110 THERAPEUTIC EXERCISES: CPT | Mod: GP,59 | Performed by: PHYSICAL THERAPIST

## 2019-09-26 PROCEDURE — 92507 TX SP LANG VOICE COMM INDIV: CPT | Mod: GN | Performed by: SPEECH-LANGUAGE PATHOLOGIST

## 2019-09-30 ENCOUNTER — HOSPITAL ENCOUNTER (OUTPATIENT)
Dept: PHYSICAL THERAPY | Facility: CLINIC | Age: 74
Setting detail: THERAPIES SERIES
End: 2019-09-30
Attending: HOSPITALIST
Payer: COMMERCIAL

## 2019-09-30 PROCEDURE — 97110 THERAPEUTIC EXERCISES: CPT | Mod: GP | Performed by: PHYSICAL THERAPIST

## 2019-09-30 PROCEDURE — 97116 GAIT TRAINING THERAPY: CPT | Mod: GP | Performed by: PHYSICAL THERAPIST

## 2019-10-01 ENCOUNTER — HOSPITAL ENCOUNTER (OUTPATIENT)
Dept: SPEECH THERAPY | Facility: CLINIC | Age: 74
Setting detail: THERAPIES SERIES
End: 2019-10-01
Attending: HOSPITALIST
Payer: COMMERCIAL

## 2019-10-01 ENCOUNTER — HOSPITAL ENCOUNTER (OUTPATIENT)
Dept: OCCUPATIONAL THERAPY | Facility: CLINIC | Age: 74
Setting detail: THERAPIES SERIES
End: 2019-10-01
Attending: HOSPITALIST
Payer: COMMERCIAL

## 2019-10-01 PROCEDURE — 97537 COMMUNITY/WORK REINTEGRATION: CPT | Mod: GO | Performed by: OCCUPATIONAL THERAPIST

## 2019-10-01 PROCEDURE — 97112 NEUROMUSCULAR REEDUCATION: CPT | Mod: GO,59 | Performed by: OCCUPATIONAL THERAPIST

## 2019-10-01 PROCEDURE — 92507 TX SP LANG VOICE COMM INDIV: CPT | Mod: GN | Performed by: SPEECH-LANGUAGE PATHOLOGIST

## 2019-10-01 PROCEDURE — 97535 SELF CARE MNGMENT TRAINING: CPT | Mod: GO | Performed by: OCCUPATIONAL THERAPIST

## 2019-10-03 ENCOUNTER — HOSPITAL ENCOUNTER (OUTPATIENT)
Dept: PHYSICAL THERAPY | Facility: CLINIC | Age: 74
Setting detail: THERAPIES SERIES
End: 2019-10-03
Attending: HOSPITALIST
Payer: COMMERCIAL

## 2019-10-03 PROCEDURE — 97116 GAIT TRAINING THERAPY: CPT | Mod: GP | Performed by: PHYSICAL THERAPIST

## 2019-10-03 PROCEDURE — 97110 THERAPEUTIC EXERCISES: CPT | Mod: GP | Performed by: PHYSICAL THERAPIST

## 2019-10-03 NOTE — PROGRESS NOTES
"Outpatient Speech Language Pathology Discharge Note     Patient: Jose Alfredo Caceres  : 1945    Beginning/End Dates of Reporting Period:  2019 to 10/1/2019    Referring Provider: Dr. BRITTA Hooker DO    Therapy Diagnosis: Mild dysarthria    Client Self Report: Pt was seen for a total of 4 therapy sessions including initial evaluation.  Pt reports that his friends and family members have commented on how he \"is sounding more like himself\".  Pt reports completing OME exercises at home as well as reading aloud to his wife each day using his clear speaking strategies including: speaking louder and over articulation.    Objective Measurements: See below.     Goals:  Goal Identifier LTG1: OME   Goal Description Pt will complete 5-10 rep. of each trained OME provided zero to min cues in order to demonstrate clear speech when speaking with his wife.   Target Date 10/31/19   Date Met  10/01/19   Progress: Goal met. Educated and modeled exercises with pt. 5x each.  Pt demonstrated 100% accuracy during OME.  PT to continue use of  OMEs at home.     Goal Identifier LTG2: Dysarthria   Goal Description Patient will learn, implement, and demonstrate independent use of speech intelligibility strategies to increase intelligibility at the sentence, extended reading and spontaneous conversation level to 95% accuracy in order to speak clearly on the phone.   Target Date 10/31/19   Date Met  10/01/19   Progress: Goal met. Pt demonstrates 100% accuracy with tongue twisters, sentences,  paragraphs, and at conversational independently.  Emotion and word emphasis: Pt demonstrates 100% accuracy independently. Pt to continue use of speech intelligibility strategies at home.     Progress Toward Goals:   Progress this reporting period: Pt has demonstrated 100% accuracy in speech intelligibility at the word, phrase, sentence, and conversational level with 0 cues.  Pt demonstrates 100% accuracy of trained OMEs and reports completing them " at home 2x/day. Pt to continue HEP including use of trained OMEs and speech intelligibility strategies: over articulating, slowing down, and speaking loudly. Pt verbalized understanding with 100% accuracy.      Plan:  Discharge from therapy.    Discharge: yes    Reason for Discharge: Patient has met all goals.    Discharge Plan: Patient to continue home program.    Thank you for referring Jose Alfredo Caceres  to outpatient therapy at [facility], [place], operated by [facility] in [place].  Please call Mary Ellen Richey MA, SLP-CCC at [phone] or email [email or web address] with any questions or concerns.      Mary Ellen Richey M.A., SLP-CCC  Speech-Language Pathologist

## 2019-10-04 ENCOUNTER — HOSPITAL ENCOUNTER (OUTPATIENT)
Dept: OCCUPATIONAL THERAPY | Facility: CLINIC | Age: 74
Setting detail: THERAPIES SERIES
End: 2019-10-04
Attending: HOSPITALIST
Payer: COMMERCIAL

## 2019-10-04 PROCEDURE — 97535 SELF CARE MNGMENT TRAINING: CPT | Mod: GO | Performed by: OCCUPATIONAL THERAPIST

## 2019-10-04 PROCEDURE — 97112 NEUROMUSCULAR REEDUCATION: CPT | Mod: GO | Performed by: OCCUPATIONAL THERAPIST

## 2019-10-04 PROCEDURE — 97110 THERAPEUTIC EXERCISES: CPT | Mod: GO | Performed by: OCCUPATIONAL THERAPIST

## 2019-10-15 ENCOUNTER — HOSPITAL ENCOUNTER (OUTPATIENT)
Dept: OCCUPATIONAL THERAPY | Facility: CLINIC | Age: 74
Setting detail: THERAPIES SERIES
End: 2019-10-15
Attending: HOSPITALIST
Payer: COMMERCIAL

## 2019-10-15 PROCEDURE — 97110 THERAPEUTIC EXERCISES: CPT | Mod: GO | Performed by: OCCUPATIONAL THERAPIST

## 2019-10-16 NOTE — PROGRESS NOTES
"Outpatient Occupational Therapy Discharge Note     Patient: Jose Alfredo Caceres  : 1945    Beginning/End Dates of Reporting Period:  19 to 10/15/2019    Referring Provider: Wyatt Hooker, DO    Therapy Diagnosis: Cerebrovascular accident (CVA) due to thrombosis of precerebral artery    Client Self Report: \"I used my chainsaw this past weekend.  I'm getting there (back to baseline).  But I get tired right away.\"    Objective Measurements:     Objective Measure: Dynavision Mode A   Details: 61 (average is 52+), continous (4 minutes): 246 (average is 200+)   Objective Measure: Dynavision Mode B   Details: 57 (average 42+)is    Objective Measure: Dynavision Mode B Divided Attention   Details: 47 with 10/10#s   Objective Measure: Box and Blocks Test   Details: R: 57 blocks (improved from 41 blocks on eval)   Objective Measure:  Strength   Details: 77.3# (improved from 61# on eval)   Objective Measure: 9HPT   Details: R=22.3# (improved from 32 seconds on eval)      Goals:     Goal Identifier 1-Fatigue   Goal Description Patient to verbalize 3 strategies for energy conservation/work simplification and fatigue management for improved independence with leisure activities, yardwork, etc.   Target Date 19   Date Met   10/15/19   Progress:     Goal Identifier 2-FMC   Goal Description Patient to demonstrate improved R hand coordination by completing the 9-Hole-Peg Test in 25 seconds for increased independence with FM ADLs (manipulating buttons, zippers, handwriting, etc.).   Target Date 19   Date Met  10/01/19   Progress:     Goal Identifier 3-GMC   Goal Description Patient to improve R GM coordination skills for increased independence during ADL/IADL tasks (dressing, cooking, etc.) by completing the Box and Blocks Test with RUE with 50 blocks.   Target Date 19   Date Met  10/04/19   Progress:     Goal Identifier 4- strength   Goal Description  Patient to demonstrate improved R  " strength to 90# for increased ADL/IADL independence (yardwork, leisure activities) and return to baseline functioning.   Target Date 11/28/19   Date Met      Progress:  Improvements made since initial evaluation but not baseline.  Patient to continue working on hand strengthening at home.     Goal Identifier 5-Driving   Goal Description Patient will demonstrate modified I with visual scanning, reaction time, divided attention, and problem solving to ensure safety for community mobility (i.e. driving, crossing the street, pathfinding, shopping, etc.)    Target Date 11/28/19   Date Met  10/01/19   Progress:     Goal Identifier 6-Handwriting   Goal Description Patient will demonstrate and report 50% improvement in handwriting legibility using adaptive equipment and adapted techniques for increased success and independence with this everyday skill.   Target Date 11/28/19   Date Met   10/15/19   Progress:       Progress Toward Goals:   Progress this reporting period: Patient was seen for a total of 6 outpatient occupational therapy sessions for fatigue management, fine motor coordination, gross motor coordination, and pre-driving assessments.  Patient has made significant improvements since initial evaluation and is ready to be done with OT.  He has completed all pre-driving assessments and is demonstrating within normal limits for visual scanning, reaction time, divided attention, problem solving, physical/motor skills, and memory.  Recommend resume driving, but avoid driving when fatigued.    Plan:  Discharge from therapy.    Discharge:    Reason for Discharge: Patient has met all goals.    Equipment Issued: [facility], theraputty, theraband HEP, fatigue managment    Discharge Plan: Patient to continue home program.

## 2019-10-17 ENCOUNTER — HOSPITAL ENCOUNTER (OUTPATIENT)
Dept: PHYSICAL THERAPY | Facility: CLINIC | Age: 74
Setting detail: THERAPIES SERIES
End: 2019-10-17
Attending: HOSPITALIST
Payer: COMMERCIAL

## 2019-10-17 PROCEDURE — 97116 GAIT TRAINING THERAPY: CPT | Mod: GP | Performed by: PHYSICAL THERAPIST

## 2019-10-17 PROCEDURE — 97112 NEUROMUSCULAR REEDUCATION: CPT | Mod: GP | Performed by: PHYSICAL THERAPIST

## 2019-10-31 ENCOUNTER — HOSPITAL ENCOUNTER (OUTPATIENT)
Dept: PHYSICAL THERAPY | Facility: CLINIC | Age: 74
Setting detail: THERAPIES SERIES
End: 2019-10-31
Attending: HOSPITALIST
Payer: COMMERCIAL

## 2019-10-31 PROCEDURE — 97116 GAIT TRAINING THERAPY: CPT | Mod: GP | Performed by: PHYSICAL THERAPIST

## 2019-10-31 PROCEDURE — 97112 NEUROMUSCULAR REEDUCATION: CPT | Mod: GP | Performed by: PHYSICAL THERAPIST

## 2019-10-31 PROCEDURE — 97110 THERAPEUTIC EXERCISES: CPT | Mod: GP | Performed by: PHYSICAL THERAPIST

## 2019-11-04 ENCOUNTER — HOSPITAL ENCOUNTER (OUTPATIENT)
Dept: PHYSICAL THERAPY | Facility: CLINIC | Age: 74
Setting detail: THERAPIES SERIES
End: 2019-11-04
Attending: HOSPITALIST
Payer: COMMERCIAL

## 2019-11-04 PROCEDURE — 97110 THERAPEUTIC EXERCISES: CPT | Mod: GP | Performed by: PHYSICAL THERAPIST

## 2019-11-04 PROCEDURE — 97116 GAIT TRAINING THERAPY: CPT | Mod: GP | Performed by: PHYSICAL THERAPIST

## 2019-11-04 PROCEDURE — 97112 NEUROMUSCULAR REEDUCATION: CPT | Mod: GP | Performed by: PHYSICAL THERAPIST

## 2019-11-06 ENCOUNTER — HOSPITAL ENCOUNTER (OUTPATIENT)
Dept: PHYSICAL THERAPY | Facility: CLINIC | Age: 74
Setting detail: THERAPIES SERIES
End: 2019-11-06
Attending: HOSPITALIST
Payer: COMMERCIAL

## 2019-11-06 PROCEDURE — 97116 GAIT TRAINING THERAPY: CPT | Mod: GP | Performed by: PHYSICAL THERAPIST

## 2019-11-06 PROCEDURE — 97112 NEUROMUSCULAR REEDUCATION: CPT | Mod: GP | Performed by: PHYSICAL THERAPIST

## 2019-11-06 PROCEDURE — 97110 THERAPEUTIC EXERCISES: CPT | Mod: GP | Performed by: PHYSICAL THERAPIST

## 2019-11-11 ENCOUNTER — HOSPITAL ENCOUNTER (OUTPATIENT)
Dept: PHYSICAL THERAPY | Facility: CLINIC | Age: 74
Setting detail: THERAPIES SERIES
End: 2019-11-11
Attending: HOSPITALIST
Payer: COMMERCIAL

## 2019-11-11 PROCEDURE — 97116 GAIT TRAINING THERAPY: CPT | Mod: GP | Performed by: PHYSICAL THERAPIST

## 2019-11-14 ENCOUNTER — HOSPITAL ENCOUNTER (OUTPATIENT)
Dept: PHYSICAL THERAPY | Facility: CLINIC | Age: 74
Setting detail: THERAPIES SERIES
End: 2019-11-14
Attending: HOSPITALIST
Payer: COMMERCIAL

## 2019-11-14 PROCEDURE — 97750 PHYSICAL PERFORMANCE TEST: CPT | Mod: GP | Performed by: PHYSICAL THERAPIST

## 2019-11-14 PROCEDURE — 97110 THERAPEUTIC EXERCISES: CPT | Mod: GP | Performed by: PHYSICAL THERAPIST

## 2019-11-14 NOTE — PROGRESS NOTES
[facility]      OUTPATIENT PHYSICAL THERAPY  PLAN OF TREATMENT FOR OUTPATIENT REHABILITATION    Patient's Last Name, First Name, M.I.                YOB: 1945  Jose Alfredo Caceres                        Provider's Name  [facility] Medical Record No.  [id number]                               Onset Date: 8/28/19   Start of Care Date: 9/9/19   Type:     _X_PT   ___OT   ___SLP Medical Diagnosis: Cerebrovascular accident (CVA) due to thrombosis of precerebral artery                       PT Diagnosis: Difficulty with gait      _________________________________________________________________________________  Plan of Treatment:IADL retraining, gait training, motor coordination training, neuromuscular re-education, strengthening, stretching, transfer training, manual therapy, other (see comments)  as needed per therapist discretion    Frequency/Duration: 2x/week x60 days      Goals:  Goal Identifier FACIT   Goal Description 1. Patient will improve score on the fatigue index scale by greater than 32/52 in order to increase energy needed to perform regular household tasks and increase aerobic tolerance for exercise   Target Date 12/07/19   Date Met  11/11/19   Progress: GOAL MET     Goal Identifier 6MWT   Goal Description 2. Patient will improve distance ambulated on the 6 minute walk test by greater than 150 feet by improving gait speed, arm swing and decrease accessory vaulting to improve walking efficiency in the community.   Target Date 12/07/19   Date Met      Progress:     Goal Identifier FGA   Goal Description 3. Patient will improve score on the functional gait assessment to greater than 24/30 to decrease fall risk and improve safety at home.   Target Date 12/07/19   Date Met      Progress:     Goal Identifier ACTIVITY TOLERANCE   Goal Description 4. Patient will improve walking  tolerance to greater than 20+ minutes of moderate intensity exercise in order to resume prior levels of aerobic exercise including alpine and cross country skiing.   Target Date 12/07/19   Date Met      Progress:       GOAL DATES EXTENDED UNTIL 1/13/2020    Progress Toward Goals:   Progress this reporting period: Jose Alfredo has made excellent progress since his evaluation. Largest improvements in general fatigue management where he is now able to complete the majority of his ADL/IADLs. He's struggling with the inconsistencies day to day, and comparing to his previous high level baseline function. His standardized balance score on the FGA improved by 5 points, and his 6MWT improved by 75 feet. Ongoing skilled therapy is required to address ongoing weakness in right hip, fatigue, provide education and reassurance regarding improvement potential, and continue with high level balance challenges in order to return to activities such as climbing a ladder.    Certification date from11/14/19  to 1/13/2020    Gris Ryan, PT          I CERTIFY THE NEED FOR THESE SERVICES FURNISHED UNDER        THIS PLAN OF TREATMENT AND WHILE UNDER MY CARE .             Physician Signature               Date    X_____________________________________________________                      Referring Provider: Dr. Umanzor

## 2019-11-18 ENCOUNTER — HOSPITAL ENCOUNTER (OUTPATIENT)
Dept: PHYSICAL THERAPY | Facility: CLINIC | Age: 74
Setting detail: THERAPIES SERIES
End: 2019-11-18
Attending: HOSPITALIST
Payer: COMMERCIAL

## 2019-11-18 PROCEDURE — 97112 NEUROMUSCULAR REEDUCATION: CPT | Mod: GP | Performed by: PHYSICAL THERAPIST

## 2019-11-18 PROCEDURE — 97110 THERAPEUTIC EXERCISES: CPT | Mod: GP | Performed by: PHYSICAL THERAPIST

## 2019-11-21 ENCOUNTER — HOSPITAL ENCOUNTER (OUTPATIENT)
Dept: PHYSICAL THERAPY | Facility: CLINIC | Age: 74
Setting detail: THERAPIES SERIES
End: 2019-11-21
Attending: HOSPITALIST
Payer: COMMERCIAL

## 2019-11-21 PROCEDURE — 97110 THERAPEUTIC EXERCISES: CPT | Mod: GP | Performed by: PHYSICAL THERAPIST

## 2019-11-21 PROCEDURE — 97112 NEUROMUSCULAR REEDUCATION: CPT | Mod: GP | Performed by: PHYSICAL THERAPIST

## 2019-12-02 ENCOUNTER — TELEPHONE (OUTPATIENT)
Dept: MEDSURG UNIT | Facility: CLINIC | Age: 74
End: 2019-12-02

## 2019-12-09 ENCOUNTER — HOSPITAL ENCOUNTER (OUTPATIENT)
Dept: PHYSICAL THERAPY | Facility: CLINIC | Age: 74
Setting detail: THERAPIES SERIES
End: 2019-12-09
Attending: HOSPITALIST
Payer: COMMERCIAL

## 2019-12-09 PROCEDURE — 97110 THERAPEUTIC EXERCISES: CPT | Mod: GP | Performed by: PHYSICAL THERAPIST

## 2019-12-12 ENCOUNTER — HOSPITAL ENCOUNTER (OUTPATIENT)
Dept: PHYSICAL THERAPY | Facility: CLINIC | Age: 74
Setting detail: THERAPIES SERIES
End: 2019-12-12
Attending: HOSPITALIST
Payer: COMMERCIAL

## 2019-12-12 PROCEDURE — 97110 THERAPEUTIC EXERCISES: CPT | Mod: GP | Performed by: PHYSICAL THERAPIST

## 2019-12-16 ENCOUNTER — HOSPITAL ENCOUNTER (OUTPATIENT)
Dept: PHYSICAL THERAPY | Facility: CLINIC | Age: 74
Setting detail: THERAPIES SERIES
End: 2019-12-16
Attending: HOSPITALIST
Payer: COMMERCIAL

## 2019-12-16 PROCEDURE — 97110 THERAPEUTIC EXERCISES: CPT | Mod: GP | Performed by: PHYSICAL THERAPIST

## 2019-12-19 ENCOUNTER — HOSPITAL ENCOUNTER (OUTPATIENT)
Dept: PHYSICAL THERAPY | Facility: CLINIC | Age: 74
Setting detail: THERAPIES SERIES
End: 2019-12-19
Attending: HOSPITALIST
Payer: COMMERCIAL

## 2019-12-19 PROCEDURE — 97116 GAIT TRAINING THERAPY: CPT | Mod: GP | Performed by: PHYSICAL THERAPIST

## 2019-12-19 PROCEDURE — 97112 NEUROMUSCULAR REEDUCATION: CPT | Mod: GP | Performed by: PHYSICAL THERAPIST

## 2019-12-19 PROCEDURE — 97110 THERAPEUTIC EXERCISES: CPT | Mod: GP | Performed by: PHYSICAL THERAPIST

## 2019-12-23 ENCOUNTER — HOSPITAL ENCOUNTER (OUTPATIENT)
Dept: PHYSICAL THERAPY | Facility: CLINIC | Age: 74
Setting detail: THERAPIES SERIES
End: 2019-12-23
Attending: HOSPITALIST
Payer: COMMERCIAL

## 2019-12-23 PROCEDURE — 97110 THERAPEUTIC EXERCISES: CPT | Mod: GP | Performed by: PHYSICAL THERAPIST

## 2019-12-23 PROCEDURE — 97116 GAIT TRAINING THERAPY: CPT | Mod: GP | Performed by: PHYSICAL THERAPIST

## 2019-12-23 PROCEDURE — 97112 NEUROMUSCULAR REEDUCATION: CPT | Mod: GP | Performed by: PHYSICAL THERAPIST

## 2020-01-08 ENCOUNTER — HOSPITAL ENCOUNTER (OUTPATIENT)
Dept: PHYSICAL THERAPY | Facility: CLINIC | Age: 75
Setting detail: THERAPIES SERIES
End: 2020-01-08
Attending: HOSPITALIST
Payer: COMMERCIAL

## 2020-01-08 PROCEDURE — 97112 NEUROMUSCULAR REEDUCATION: CPT | Mod: GP | Performed by: PHYSICAL THERAPIST

## 2020-01-08 PROCEDURE — 97116 GAIT TRAINING THERAPY: CPT | Mod: GP | Performed by: PHYSICAL THERAPIST

## 2020-01-15 ENCOUNTER — HOSPITAL ENCOUNTER (OUTPATIENT)
Dept: PHYSICAL THERAPY | Facility: CLINIC | Age: 75
Setting detail: THERAPIES SERIES
End: 2020-01-15
Attending: HOSPITALIST
Payer: COMMERCIAL

## 2020-01-15 PROCEDURE — 97110 THERAPEUTIC EXERCISES: CPT | Mod: GP | Performed by: PHYSICAL THERAPIST

## 2020-01-15 PROCEDURE — 97750 PHYSICAL PERFORMANCE TEST: CPT | Mod: GP | Performed by: PHYSICAL THERAPIST

## 2020-01-15 NOTE — PROGRESS NOTES
[facility]      OUTPATIENT PHYSICAL THERAPY  PLAN OF TREATMENT FOR OUTPATIENT REHABILITATION    Patient's Last Name, First Name, M.I.                YOB: 1945  Jose Alfredo Caceres                        Provider's Name  [facility] Medical Record No.  [id number]                               Onset Date: 8/28/19   Start of Care Date: 9/19/19   Type:     _X_PT   ___OT   ___SLP Medical Diagnosis: Cerebrovascular accident (CVA) due to thrombosis of precerebral artery                       PT Diagnosis: Difficulty with gait      _________________________________________________________________________________  Plan of Treatment:IADL retraining, gait training, motor coordination training, neuromuscular re-education, strengthening, stretching, transfer training, manual therapy, other (see comments)  as needed per therapist discretion    Frequency/Duration: 2x/week x60 days, taper to 1x/week x30 days     Goals:  Goal Identifier FACIT   Goal Description 1. Patient will improve score on the fatigue index scale by greater than 32/52 in order to increase energy needed to perform regular household tasks and increase aerobic tolerance for exercise   Target Date 04/04/20   Date Met      Progress: GOAL HAD PREVIOUSLY BEEN MET, THEN REGRESSED     Goal Identifier 6MWT   Goal Description 2. Patient will improve distance ambulated on the 6 minute walk test by greater than 150 feet by improving gait speed, arm swing and decrease accessory vaulting to improve walking efficiency in the community.   Target Date 01/13/19   Date Met  01/15/20   Progress: GOAL MET     Goal Identifier FGA   Goal Description 3. Patient will improve score on the functional gait assessment to greater than 24/30 to decrease fall risk and improve safety at home.   Target Date 01/13/19   Date Met  01/15/20   Progress: GOAL MET      Goal Identifier ACTIVITY TOLERANCE   Goal Description 4. Patient will improve walking tolerance to greater than 20+ minutes of moderate intensity exercise in order to resume prior levels of aerobic exercise including alpine and cross country skiing.   Target Date 01/13/19   Date Met  01/15/20   Progress: GOAL MET     Goal Identifier FGA   Goal Description 5. Pt will score 29/30 on the FGA in order to demonstrate improved balance and return his balance to what his PLOF.   Target Date 04/04/20   Date Met      Progress: NEW GOAL FOR THIS REPORTING PERIOD         Progress Toward Goals:   Progress this reporting period: Jose Alfredo continues to be very motivated during his time in therapy. His 6MWT has normalized and I except his gait speed to be leveled off. The largest change involves his fatigue, his FACIT score decreased by 10 points and therapy will continue to focus on fatigue and hip strength, as well decreasing gait path deviation during longer duration walking.     Certification date from 1/14/2020 to 4/4/2020.    Gris Ryan, PT          I CERTIFY THE NEED FOR THESE SERVICES FURNISHED UNDER        THIS PLAN OF TREATMENT AND WHILE UNDER MY CARE .             Physician Signature               Date    X_____________________________________________________                      Referring Provider: Dr. Umanzor

## 2020-01-20 ENCOUNTER — HOSPITAL ENCOUNTER (OUTPATIENT)
Dept: PHYSICAL THERAPY | Facility: CLINIC | Age: 75
Setting detail: THERAPIES SERIES
End: 2020-01-20
Attending: HOSPITALIST
Payer: COMMERCIAL

## 2020-01-20 PROCEDURE — 97110 THERAPEUTIC EXERCISES: CPT | Mod: GP | Performed by: PHYSICAL THERAPIST

## 2020-01-20 PROCEDURE — 97116 GAIT TRAINING THERAPY: CPT | Mod: GP | Performed by: PHYSICAL THERAPIST

## 2020-01-30 ENCOUNTER — HOSPITAL ENCOUNTER (OUTPATIENT)
Dept: PHYSICAL THERAPY | Facility: CLINIC | Age: 75
Setting detail: THERAPIES SERIES
End: 2020-01-30
Attending: HOSPITALIST
Payer: COMMERCIAL

## 2020-01-30 PROCEDURE — 97110 THERAPEUTIC EXERCISES: CPT | Mod: GP | Performed by: PHYSICAL THERAPIST

## 2020-02-05 ENCOUNTER — HOSPITAL ENCOUNTER (OUTPATIENT)
Dept: PHYSICAL THERAPY | Facility: CLINIC | Age: 75
Setting detail: THERAPIES SERIES
End: 2020-02-05
Attending: HOSPITALIST
Payer: COMMERCIAL

## 2020-02-05 PROCEDURE — 97110 THERAPEUTIC EXERCISES: CPT | Mod: GP | Performed by: PHYSICAL THERAPIST

## 2020-02-17 ENCOUNTER — HOSPITAL ENCOUNTER (OUTPATIENT)
Dept: PHYSICAL THERAPY | Facility: CLINIC | Age: 75
Setting detail: THERAPIES SERIES
End: 2020-02-17
Attending: HOSPITALIST
Payer: COMMERCIAL

## 2020-02-17 PROCEDURE — 97110 THERAPEUTIC EXERCISES: CPT | Mod: GP | Performed by: PHYSICAL THERAPIST

## 2020-02-20 ENCOUNTER — HOSPITAL ENCOUNTER (OUTPATIENT)
Dept: PHYSICAL THERAPY | Facility: CLINIC | Age: 75
Setting detail: THERAPIES SERIES
End: 2020-02-20
Attending: HOSPITALIST
Payer: COMMERCIAL

## 2020-02-20 PROCEDURE — 97110 THERAPEUTIC EXERCISES: CPT | Mod: GP | Performed by: PHYSICAL THERAPIST

## 2020-02-24 ENCOUNTER — HOSPITAL ENCOUNTER (OUTPATIENT)
Dept: PHYSICAL THERAPY | Facility: CLINIC | Age: 75
Setting detail: THERAPIES SERIES
End: 2020-02-24
Attending: HOSPITALIST
Payer: COMMERCIAL

## 2020-02-24 PROCEDURE — 97110 THERAPEUTIC EXERCISES: CPT | Mod: GP | Performed by: PHYSICAL THERAPIST

## 2020-02-24 NOTE — PROGRESS NOTES
Outpatient Physical Therapy Discharge Note     Patient: Jose Alfredo Caceres  : 1945    Beginning/End Dates of Reporting Period:  1/15/20 to 2020    Referring Provider: Dr. Wyatt Hooker, DO    Therapy Diagnosis: Difficulty with gait     Client Self Report: Pt reports that he is going to miss PT. Sometimes it's hard to remember how far I've come.    Objective Measurements:  Objective Measure: 6MWT  Details: currently 1680', improved from 1425' on 1/15/2020  Objective Measure: FGA  Details: currently 30/30, improved from 20 on 1/15/2020  Objective Measure: FACIT  Details: currently 35/52, improved from 26/52 on 1/15/2020  Objective Measure: 30 sec sit to stand  Details: 15 reps, met age matched norms    Goals:  Goal Identifier FACIT   Goal Description 1. Patient will improve score on the fatigue index scale by greater than 32/52 in order to increase energy needed to perform regular household tasks and increase aerobic tolerance for exercise   Target Date 20   Date Met  20   Progress: Goal Met     Goal Identifier 6MWT   Goal Description 2. Patient will improve distance ambulated on the 6 minute walk test by greater than 150 feet by improving gait speed, arm swing and decrease accessory vaulting to improve walking efficiency in the community.   Target Date 19   Date Met  01/15/20   Progress:Goal Previously Met     Goal Identifier FGA   Goal Description 3. Patient will improve score on the functional gait assessment to greater than 24/30 to decrease fall risk and improve safety at home.   Target Date 19   Date Met  01/15/20   Progress:Goal Previously Met     Goal Identifier ACTIVITY TOLERANCE   Goal Description 4. Patient will improve walking tolerance to greater than 20+ minutes of moderate intensity exercise in order to resume prior levels of aerobic exercise including alpine and cross country skiing.   Target Date 19   Date Met  01/15/20   Progress:Goal Previously Met      Goal Identifier FGA   Goal Description 5. Pt will score 29/30 on the FGA in order to demonstrate improved balance and return his balance to what his PLOF.   Target Date 04/04/20   Date Met  02/24/20   Progress: Goal Met     Jose Alfredo has made excellent progress in therapy. He was seen a total of 26 sessions and has met all goals. He's motivated and plans to continue exercising and walking. Limitations include residual right hip weakness.     Plan:  Discharge from therapy.    Reason for Discharge: Patient has met all goals.    Equipment Issued: none    Discharge Plan: Patient to continue home program.

## 2021-03-02 ENCOUNTER — IMMUNIZATION (OUTPATIENT)
Dept: NURSING | Facility: CLINIC | Age: 76
End: 2021-03-02
Payer: COMMERCIAL

## 2021-03-02 PROCEDURE — 0001A PR COVID VAC PFIZER DIL RECON 30 MCG/0.3 ML IM: CPT

## 2021-03-02 PROCEDURE — 91300 PR COVID VAC PFIZER DIL RECON 30 MCG/0.3 ML IM: CPT

## 2021-03-21 ENCOUNTER — HEALTH MAINTENANCE LETTER (OUTPATIENT)
Age: 76
End: 2021-03-21

## 2021-03-23 ENCOUNTER — IMMUNIZATION (OUTPATIENT)
Dept: NURSING | Facility: CLINIC | Age: 76
End: 2021-03-23
Attending: INTERNAL MEDICINE
Payer: COMMERCIAL

## 2021-03-23 PROCEDURE — 0002A PR COVID VAC PFIZER DIL RECON 30 MCG/0.3 ML IM: CPT

## 2021-03-23 PROCEDURE — 91300 PR COVID VAC PFIZER DIL RECON 30 MCG/0.3 ML IM: CPT

## 2021-09-05 ENCOUNTER — HEALTH MAINTENANCE LETTER (OUTPATIENT)
Age: 76
End: 2021-09-05

## 2022-03-11 ENCOUNTER — HOSPITAL ENCOUNTER (EMERGENCY)
Facility: CLINIC | Age: 77
Discharge: HOME | End: 2022-03-11
Attending: EMERGENCY MEDICINE | Admitting: EMERGENCY MEDICINE
Payer: COMMERCIAL

## 2022-03-11 ENCOUNTER — APPOINTMENT (OUTPATIENT)
Dept: CT IMAGING | Facility: CLINIC | Age: 77
End: 2022-03-11
Attending: EMERGENCY MEDICINE
Payer: COMMERCIAL

## 2022-03-11 VITALS
SYSTOLIC BLOOD PRESSURE: 136 MMHG | RESPIRATION RATE: 18 BRPM | HEART RATE: 67 BPM | TEMPERATURE: 96.9 F | DIASTOLIC BLOOD PRESSURE: 78 MMHG | HEIGHT: 68 IN | WEIGHT: 159 LBS | BODY MASS INDEX: 24.1 KG/M2 | OXYGEN SATURATION: 96 %

## 2022-03-11 DIAGNOSIS — N13.30 HYDRONEPHROSIS, UNSPECIFIED HYDRONEPHROSIS TYPE: ICD-10-CM

## 2022-03-11 DIAGNOSIS — R10.9 FLANK PAIN: ICD-10-CM

## 2022-03-11 DIAGNOSIS — R31.29 MICROSCOPIC HEMATURIA: ICD-10-CM

## 2022-03-11 LAB
ALBUMIN SERPL-MCNC: 3.8 G/DL (ref 3.4–5)
ALBUMIN UR-MCNC: NEGATIVE MG/DL
ALP SERPL-CCNC: 73 U/L (ref 40–150)
ALT SERPL W P-5'-P-CCNC: 37 U/L (ref 0–70)
ANION GAP SERPL CALCULATED.3IONS-SCNC: 4 MMOL/L (ref 3–14)
APPEARANCE UR: CLEAR
AST SERPL W P-5'-P-CCNC: 20 U/L (ref 0–45)
BASOPHILS # BLD AUTO: 0 10E3/UL (ref 0–0.2)
BASOPHILS NFR BLD AUTO: 0 %
BILIRUB SERPL-MCNC: 0.4 MG/DL (ref 0.2–1.3)
BILIRUB UR QL STRIP: NEGATIVE
BUN SERPL-MCNC: 18 MG/DL (ref 7–30)
CALCIUM SERPL-MCNC: 9.2 MG/DL (ref 8.5–10.1)
CHLORIDE BLD-SCNC: 106 MMOL/L (ref 94–109)
CO2 SERPL-SCNC: 29 MMOL/L (ref 20–32)
COLOR UR AUTO: ABNORMAL
CREAT SERPL-MCNC: 1.44 MG/DL (ref 0.66–1.25)
EOSINOPHIL # BLD AUTO: 0 10E3/UL (ref 0–0.7)
EOSINOPHIL NFR BLD AUTO: 1 %
ERYTHROCYTE [DISTWIDTH] IN BLOOD BY AUTOMATED COUNT: 13 % (ref 10–15)
GFR SERPL CREATININE-BSD FRML MDRD: 50 ML/MIN/1.73M2
GLUCOSE BLD-MCNC: 103 MG/DL (ref 70–99)
GLUCOSE UR STRIP-MCNC: NEGATIVE MG/DL
HCT VFR BLD AUTO: 44.9 % (ref 40–53)
HGB BLD-MCNC: 14.4 G/DL (ref 13.3–17.7)
HGB UR QL STRIP: ABNORMAL
HOLD SPECIMEN: NORMAL
IMM GRANULOCYTES # BLD: 0 10E3/UL
IMM GRANULOCYTES NFR BLD: 0 %
KETONES UR STRIP-MCNC: 10 MG/DL
LEUKOCYTE ESTERASE UR QL STRIP: NEGATIVE
LIPASE SERPL-CCNC: 122 U/L (ref 73–393)
LYMPHOCYTES # BLD AUTO: 1 10E3/UL (ref 0.8–5.3)
LYMPHOCYTES NFR BLD AUTO: 12 %
MCH RBC QN AUTO: 29.1 PG (ref 26.5–33)
MCHC RBC AUTO-ENTMCNC: 32.1 G/DL (ref 31.5–36.5)
MCV RBC AUTO: 91 FL (ref 78–100)
MONOCYTES # BLD AUTO: 0.5 10E3/UL (ref 0–1.3)
MONOCYTES NFR BLD AUTO: 6 %
MUCOUS THREADS #/AREA URNS LPF: PRESENT /LPF
NEUTROPHILS # BLD AUTO: 6.9 10E3/UL (ref 1.6–8.3)
NEUTROPHILS NFR BLD AUTO: 81 %
NITRATE UR QL: NEGATIVE
NRBC # BLD AUTO: 0 10E3/UL
NRBC BLD AUTO-RTO: 0 /100
PH UR STRIP: 5.5 [PH] (ref 5–7)
PLATELET # BLD AUTO: 221 10E3/UL (ref 150–450)
POTASSIUM BLD-SCNC: 4 MMOL/L (ref 3.4–5.3)
PROT SERPL-MCNC: 7.1 G/DL (ref 6.8–8.8)
RBC # BLD AUTO: 4.94 10E6/UL (ref 4.4–5.9)
RBC URINE: 5 /HPF
SODIUM SERPL-SCNC: 139 MMOL/L (ref 133–144)
SP GR UR STRIP: 1.04 (ref 1–1.03)
SQUAMOUS EPITHELIAL: <1 /HPF
UROBILINOGEN UR STRIP-MCNC: NORMAL MG/DL
WBC # BLD AUTO: 8.5 10E3/UL (ref 4–11)
WBC URINE: 0 /HPF

## 2022-03-11 PROCEDURE — 250N000009 HC RX 250: Performed by: EMERGENCY MEDICINE

## 2022-03-11 PROCEDURE — 83690 ASSAY OF LIPASE: CPT | Performed by: EMERGENCY MEDICINE

## 2022-03-11 PROCEDURE — 80053 COMPREHEN METABOLIC PANEL: CPT | Performed by: EMERGENCY MEDICINE

## 2022-03-11 PROCEDURE — 36415 COLL VENOUS BLD VENIPUNCTURE: CPT | Performed by: EMERGENCY MEDICINE

## 2022-03-11 PROCEDURE — 74177 CT ABD & PELVIS W/CONTRAST: CPT

## 2022-03-11 PROCEDURE — 85025 COMPLETE CBC W/AUTO DIFF WBC: CPT | Performed by: EMERGENCY MEDICINE

## 2022-03-11 PROCEDURE — 81003 URINALYSIS AUTO W/O SCOPE: CPT | Performed by: EMERGENCY MEDICINE

## 2022-03-11 PROCEDURE — 99285 EMERGENCY DEPT VISIT HI MDM: CPT | Mod: 25

## 2022-03-11 PROCEDURE — 250N000011 HC RX IP 250 OP 636: Performed by: EMERGENCY MEDICINE

## 2022-03-11 RX ORDER — ONDANSETRON 2 MG/ML
4 INJECTION INTRAMUSCULAR; INTRAVENOUS EVERY 30 MIN PRN
Status: DISCONTINUED | OUTPATIENT
Start: 2022-03-11 | End: 2022-03-11 | Stop reason: HOSPADM

## 2022-03-11 RX ORDER — TAMSULOSIN HYDROCHLORIDE 0.4 MG/1
0.4 CAPSULE ORAL DAILY
Qty: 10 CAPSULE | Refills: 0 | Status: SHIPPED | OUTPATIENT
Start: 2022-03-11 | End: 2022-03-21

## 2022-03-11 RX ORDER — IOPAMIDOL 755 MG/ML
80 INJECTION, SOLUTION INTRAVASCULAR ONCE
Status: COMPLETED | OUTPATIENT
Start: 2022-03-11 | End: 2022-03-11

## 2022-03-11 RX ORDER — MORPHINE SULFATE 4 MG/ML
4 INJECTION, SOLUTION INTRAMUSCULAR; INTRAVENOUS
Status: DISCONTINUED | OUTPATIENT
Start: 2022-03-11 | End: 2022-03-11 | Stop reason: HOSPADM

## 2022-03-11 RX ADMIN — SODIUM CHLORIDE 63 ML: 9 INJECTION, SOLUTION INTRAVENOUS at 15:58

## 2022-03-11 RX ADMIN — IOPAMIDOL 80 ML: 755 INJECTION, SOLUTION INTRAVENOUS at 15:57

## 2022-03-11 ASSESSMENT — ENCOUNTER SYMPTOMS
DIARRHEA: 0
DYSURIA: 0
ABDOMINAL PAIN: 1
FEVER: 0
NAUSEA: 1
HEMATURIA: 0
VOMITING: 1

## 2022-03-11 NOTE — ED PROVIDER NOTES
History   Chief Complaint:  Abdominal Pain       The history is provided by the patient.      Jose Alfredo Caceres is a 76 year old male with history of stroke who presents with abdominal pain. This morning around 0500 Jose Alfredo developed right sided abdominal pain radiating into the suprapubic area. Around 0530 to 0600 he then developed nausea and dry heaving/emesis with achy pains across his lower abdomen until around 0730 when he started to feel better. Around 1100 while on the drive up to his cabin his symptoms returned, prompting his visit to the ED. At bedside he denies fever, dysuria, hematuria, diarrhea, or sick contacts recently. He does not currently feel nauseous. He has a history of inguina hernia surgeries with the most recent 5 years ago.  He has never had similar symptoms, he denies dysuria or hematuria.  There are no further aggravating or alleviating factors no further associated symptoms.      Review of Systems   Constitutional: Negative for fever.   Gastrointestinal: Positive for abdominal pain, nausea and vomiting. Negative for diarrhea.   Genitourinary: Negative for dysuria and hematuria.   All other systems reviewed and are negative.    Allergies:  No Known Allergies    Medications:  aspirin 81 MG EC tablet  Crestor  Keflex  Simvastatin     Past Medical History:     Back pain   Elevated C-reactive protein  Fracture of lower leg   High cholesterol   Homocystinemia  Hyperlipidemia   Inguinal hernia, left   Homocystinemia  Cerebrovascular accident  CVA    Past Surgical History:    Vasectomy  Thayer teeth extraction  Laparoscopic herniorrhaphy  Foot surgery  Tibia fracture surgery    Family History:    Cancer    Social History:  Patient unaccompanied   PCP: Flaco Umanzor     Physical Exam     Patient Vitals for the past 24 hrs:   BP Temp Temp src Pulse Resp SpO2 Height Weight   03/11/22 1630 139/82 -- -- 69 -- 97 % -- --   03/11/22 1615 (!) 140/76 -- -- -- -- 98 % -- --   03/11/22 1530 (!) 141/71 -- --  "72 16 98 % -- --   03/11/22 1430 (!) 148/72 -- -- 76 18 99 % -- --   03/11/22 1228 (!) 156/75 96.9  F (36.1  C) Temporal 79 20 98 % 1.727 m (5' 8\") 72.1 kg (159 lb)       Physical Exam    General: Alert, interactive in mild distress  Head:  Scalp is atraumatic  Eyes:  The pupils are equal, round, and reactive to light    EOM's intact    No scleral icterus  ENT:      Nose:  The external nose is normal  Ears:  External ears are normal  Mouth/Throat: The oropharynx is normal    Mucus membranes are moist      Neck:  Normal range of motion.      There is no rigidity.    Trachea is in the midline         CV:  Regular rate and rhythm    No murmur   Resp:  Breath sounds are clear bilaterally    Non-labored, no retractions or accessory muscle use      GI:  Abdomen is soft, no distension. Right lower quadrant tenderness    No CVA tenderness  MS:  Normal strength in all 4 extremities  Skin:  Warm and dry, No rash or lesions noted.  Neuro: Strength 5/5 x4.     GCS: 15  Psych:  Awake. Alert.  Normal affect.      Appropriate interactions.    Emergency Department Course     Imaging:  CT Abdomen Pelvis w Contrast   Final Result   IMPRESSION:    1.  Minimal right hydronephrosis and hydroureter without an   obstructing stone or mass.   2.  Otherwise, no acute findings in the abdomen and pelvis. Normal   appendix.   3.  Mild prostatomegaly.   4.  Mild colonic diverticulosis.      COURT DAMON MD            SYSTEM ID:  [id number]        Report per radiology    Laboratory:  Labs Ordered and Resulted from Time of ED Arrival to Time of ED Departure   COMPREHENSIVE METABOLIC PANEL - Abnormal       Result Value    Sodium 139      Potassium 4.0      Chloride 106      Carbon Dioxide (CO2) 29      Anion Gap 4      Urea Nitrogen 18      Creatinine 1.44 (*)     Calcium 9.2      Glucose 103 (*)     Alkaline Phosphatase 73      AST 20      ALT 37      Protein Total 7.1      Albumin 3.8      Bilirubin Total 0.4      GFR Estimate 50 (*)    ROUTINE UA " WITH MICROSCOPIC REFLEX TO CULTURE - Abnormal    Color Urine Light Yellow      Appearance Urine Clear      Glucose Urine Negative      Bilirubin Urine Negative      Ketones Urine 10  (*)     Specific Gravity Urine 1.041 (*)     Blood Urine Trace (*)     pH Urine 5.5      Protein Albumin Urine Negative      Urobilinogen Urine Normal      Nitrite Urine Negative      Leukocyte Esterase Urine Negative      Mucus Urine Present (*)     RBC Urine 5 (*)     WBC Urine 0      Squamous Epithelials Urine <1     LIPASE - Normal    Lipase 122     CBC WITH PLATELETS AND DIFFERENTIAL    WBC Count 8.5      RBC Count 4.94      Hemoglobin 14.4      Hematocrit 44.9      MCV 91      MCH 29.1      MCHC 32.1      RDW 13.0      Platelet Count 221      % Neutrophils 81      % Lymphocytes 12      % Monocytes 6      % Eosinophils 1      % Basophils 0      % Immature Granulocytes 0      NRBCs per 100 WBC 0      Absolute Neutrophils 6.9      Absolute Lymphocytes 1.0      Absolute Monocytes 0.5      Absolute Eosinophils 0.0      Absolute Basophils 0.0      Absolute Immature Granulocytes 0.0      Absolute NRBCs 0.0          Procedures  None    Emergency Department Course:  Reviewed:  I reviewed nursing notes, vitals, past medical history and Care Everywhere    Assessments/Consults:  ED Course as of 03/11/22 1813   Fri Mar 11, 2022   1411 Obtained history and examined the patient as noted above.   1807 Rechecked the patient and prepared for discharge. Asked patient where he wants his prescriptions to be sent to.        Disposition:  The patient was discharged to home.     Impression & Plan   Medical Decision Making:  Following presentation history and physical examination were performed, the above work-up was undertaken.  CT imaging of the abdomen pelvis demonstrates hydronephrosis and hydroureter.  Given the patient's clinical presentation and timeline of his symptoms I favor a possible passed kidney stone.  His symptoms are much improved here,  urinalysis demonstrates no signs of pyelonephritis but does have microscopic hematuria.  Otherwise abdominal examination is unremarkable and CT imaging demonstrates no signs of appendicitis diverticulitis bowel obstruction or more concerning illness.  He feels comfortable discharged home I think this is reasonable I prescribed Flomax as noted below and he will return if new symptoms develop.    Diagnosis:    ICD-10-CM    1. Flank pain  R10.9    2. Hydronephrosis, unspecified hydronephrosis type  N13.30    3. Microscopic hematuria  R31.29        Discharge Medications:  New Prescriptions    TAMSULOSIN (FLOMAX) 0.4 MG CAPSULE    Take 1 capsule (0.4 mg) by mouth daily for 10 doses       Scribe Disclosure:  TANMAY, Leonidas Gauthier, am serving as a scribe at 2:05 PM on 3/11/2022 to document services personally performed by Konrad Sawyer MD based on my observations and the provider's statements to me.           Konrad Sawyer MD  03/11/22 2010

## 2022-03-11 NOTE — ED TRIAGE NOTES
Pt woke up this morning from right abdominal cramping, it subsided for a few hours and now back again.

## 2022-04-17 ENCOUNTER — HEALTH MAINTENANCE LETTER (OUTPATIENT)
Age: 77
End: 2022-04-17

## 2022-10-23 ENCOUNTER — HEALTH MAINTENANCE LETTER (OUTPATIENT)
Age: 77
End: 2022-10-23

## 2023-06-01 ENCOUNTER — HEALTH MAINTENANCE LETTER (OUTPATIENT)
Age: 78
End: 2023-06-01

## 2024-06-08 ENCOUNTER — HEALTH MAINTENANCE LETTER (OUTPATIENT)
Age: 79
End: 2024-06-08

## (undated) DEVICE — ESU GROUND PAD UNIVERSAL W/O CORD

## (undated) DEVICE — ENDO TROCAR BALLOON TIP 10MM  OMS-T10SB

## (undated) DEVICE — ESU CORD MONOPOLAR 10'  E0510

## (undated) DEVICE — GOWN IMPERVIOUS SPECIALTY XLG/XLONG 32474

## (undated) DEVICE — GLOVE PROTEXIS W/NEU-THERA 8.0  2D73TE80

## (undated) DEVICE — BLADE CLIPPER 4406

## (undated) DEVICE — ENDO TROCAR DISSECTING BALLOON OMS-PDBS2

## (undated) DEVICE — PREP CHLORAPREP 26ML TINTED ORANGE  260815

## (undated) DEVICE — SU VICRYL 4-0 PS-2 18" UND J496H

## (undated) DEVICE — ENDO TROCAR 05.5MM PEDI 24055

## (undated) DEVICE — SU VICRYL 0 UR-6 27" J603H

## (undated) DEVICE — LINEN TOWEL PACK X5 5464

## (undated) DEVICE — PACK LAP CHOLE SLC15LCFSD

## (undated) DEVICE — CATH TRAY FOLEY COUDE 16FR SILVER W/URINE METER 350ML 304716

## (undated) RX ORDER — CEFAZOLIN SODIUM 2 G/100ML
INJECTION, SOLUTION INTRAVENOUS
Status: DISPENSED
Start: 2017-11-10

## (undated) RX ORDER — DEXAMETHASONE SODIUM PHOSPHATE 4 MG/ML
INJECTION, SOLUTION INTRA-ARTICULAR; INTRALESIONAL; INTRAMUSCULAR; INTRAVENOUS; SOFT TISSUE
Status: DISPENSED
Start: 2017-11-10

## (undated) RX ORDER — HYDROMORPHONE HYDROCHLORIDE 1 MG/ML
INJECTION, SOLUTION INTRAMUSCULAR; INTRAVENOUS; SUBCUTANEOUS
Status: DISPENSED
Start: 2017-11-10

## (undated) RX ORDER — GLYCOPYRROLATE 0.2 MG/ML
INJECTION, SOLUTION INTRAMUSCULAR; INTRAVENOUS
Status: DISPENSED
Start: 2017-11-10

## (undated) RX ORDER — FENTANYL CITRATE 50 UG/ML
INJECTION, SOLUTION INTRAMUSCULAR; INTRAVENOUS
Status: DISPENSED
Start: 2017-11-10

## (undated) RX ORDER — PROPOFOL 10 MG/ML
INJECTION, EMULSION INTRAVENOUS
Status: DISPENSED
Start: 2017-11-10

## (undated) RX ORDER — ONDANSETRON 2 MG/ML
INJECTION INTRAMUSCULAR; INTRAVENOUS
Status: DISPENSED
Start: 2017-11-10

## (undated) RX ORDER — LIDOCAINE HYDROCHLORIDE 20 MG/ML
INJECTION, SOLUTION EPIDURAL; INFILTRATION; INTRACAUDAL; PERINEURAL
Status: DISPENSED
Start: 2017-11-10

## (undated) RX ORDER — BUPIVACAINE HYDROCHLORIDE AND EPINEPHRINE 5; 5 MG/ML; UG/ML
INJECTION, SOLUTION EPIDURAL; INTRACAUDAL; PERINEURAL
Status: DISPENSED
Start: 2017-11-10

## (undated) RX ORDER — OXYCODONE AND ACETAMINOPHEN 5; 325 MG/1; MG/1
TABLET ORAL
Status: DISPENSED
Start: 2017-11-10